# Patient Record
Sex: FEMALE | URBAN - METROPOLITAN AREA
[De-identification: names, ages, dates, MRNs, and addresses within clinical notes are randomized per-mention and may not be internally consistent; named-entity substitution may affect disease eponyms.]

---

## 2017-08-01 ENCOUNTER — EMERGENCY (EMERGENCY)
Facility: HOSPITAL | Age: 22
LOS: 1 days | Discharge: HOME | End: 2017-08-01
Attending: EMERGENCY MEDICINE
Payer: COMMERCIAL

## 2017-08-01 VITALS
TEMPERATURE: 99 F | RESPIRATION RATE: 16 BRPM | DIASTOLIC BLOOD PRESSURE: 92 MMHG | OXYGEN SATURATION: 99 % | HEART RATE: 86 BPM | HEIGHT: 68 IN | SYSTOLIC BLOOD PRESSURE: 144 MMHG | WEIGHT: 199.96 LBS

## 2017-08-01 VITALS
TEMPERATURE: 98 F | RESPIRATION RATE: 16 BRPM | DIASTOLIC BLOOD PRESSURE: 71 MMHG | OXYGEN SATURATION: 100 % | HEART RATE: 56 BPM | SYSTOLIC BLOOD PRESSURE: 120 MMHG

## 2017-08-01 DIAGNOSIS — R55 SYNCOPE AND COLLAPSE: ICD-10-CM

## 2017-08-01 LAB
ALBUMIN SERPL ELPH-MCNC: 4.2 G/DL — SIGNIFICANT CHANGE UP (ref 3.3–5.2)
ALP SERPL-CCNC: 90 U/L — SIGNIFICANT CHANGE UP (ref 40–120)
ALT FLD-CCNC: 13 U/L — SIGNIFICANT CHANGE UP
ANION GAP SERPL CALC-SCNC: 13 MMOL/L — SIGNIFICANT CHANGE UP (ref 5–17)
AST SERPL-CCNC: 24 U/L — SIGNIFICANT CHANGE UP
BILIRUB SERPL-MCNC: 0.2 MG/DL — LOW (ref 0.4–2)
BUN SERPL-MCNC: 7 MG/DL — LOW (ref 8–20)
CALCIUM SERPL-MCNC: 8.8 MG/DL — SIGNIFICANT CHANGE UP (ref 8.6–10.2)
CHLORIDE SERPL-SCNC: 104 MMOL/L — SIGNIFICANT CHANGE UP (ref 98–107)
CO2 SERPL-SCNC: 22 MMOL/L — SIGNIFICANT CHANGE UP (ref 22–29)
CREAT SERPL-MCNC: 0.83 MG/DL — SIGNIFICANT CHANGE UP (ref 0.5–1.3)
GLUCOSE SERPL-MCNC: 82 MG/DL — SIGNIFICANT CHANGE UP (ref 70–115)
HCG SERPL-ACNC: <2 MIU/ML — SIGNIFICANT CHANGE UP
HCT VFR BLD CALC: 37.5 % — SIGNIFICANT CHANGE UP (ref 37–47)
HGB BLD-MCNC: 12.2 G/DL — SIGNIFICANT CHANGE UP (ref 12–16)
MCHC RBC-ENTMCNC: 29.4 PG — SIGNIFICANT CHANGE UP (ref 27–31)
MCHC RBC-ENTMCNC: 32.5 G/DL — SIGNIFICANT CHANGE UP (ref 32–36)
MCV RBC AUTO: 90.4 FL — SIGNIFICANT CHANGE UP (ref 81–99)
PLATELET # BLD AUTO: 216 K/UL — SIGNIFICANT CHANGE UP (ref 150–400)
POTASSIUM SERPL-MCNC: 4.5 MMOL/L — SIGNIFICANT CHANGE UP (ref 3.5–5.3)
POTASSIUM SERPL-SCNC: 4.5 MMOL/L — SIGNIFICANT CHANGE UP (ref 3.5–5.3)
PROT SERPL-MCNC: 7.4 G/DL — SIGNIFICANT CHANGE UP (ref 6.6–8.7)
RBC # BLD: 4.15 M/UL — LOW (ref 4.4–5.2)
RBC # FLD: 12.2 % — SIGNIFICANT CHANGE UP (ref 11–15.6)
SODIUM SERPL-SCNC: 139 MMOL/L — SIGNIFICANT CHANGE UP (ref 135–145)
TROPONIN T SERPL-MCNC: <0.01 NG/ML — SIGNIFICANT CHANGE UP (ref 0–0.06)
WBC # BLD: 5.2 K/UL — SIGNIFICANT CHANGE UP (ref 4.8–10.8)
WBC # FLD AUTO: 5.2 K/UL — SIGNIFICANT CHANGE UP (ref 4.8–10.8)

## 2017-08-01 PROCEDURE — 93010 ELECTROCARDIOGRAM REPORT: CPT

## 2017-08-01 PROCEDURE — 71045 X-RAY EXAM CHEST 1 VIEW: CPT

## 2017-08-01 PROCEDURE — 84702 CHORIONIC GONADOTROPIN TEST: CPT

## 2017-08-01 PROCEDURE — 99285 EMERGENCY DEPT VISIT HI MDM: CPT

## 2017-08-01 PROCEDURE — 71010: CPT | Mod: 26

## 2017-08-01 PROCEDURE — 93005 ELECTROCARDIOGRAM TRACING: CPT

## 2017-08-01 PROCEDURE — 99284 EMERGENCY DEPT VISIT MOD MDM: CPT | Mod: 25

## 2017-08-01 PROCEDURE — 36415 COLL VENOUS BLD VENIPUNCTURE: CPT

## 2017-08-01 PROCEDURE — 82962 GLUCOSE BLOOD TEST: CPT

## 2017-08-01 PROCEDURE — 84484 ASSAY OF TROPONIN QUANT: CPT

## 2017-08-01 PROCEDURE — 85027 COMPLETE CBC AUTOMATED: CPT

## 2017-08-01 PROCEDURE — 80053 COMPREHEN METABOLIC PANEL: CPT

## 2017-08-01 RX ORDER — SODIUM CHLORIDE 9 MG/ML
1000 INJECTION INTRAMUSCULAR; INTRAVENOUS; SUBCUTANEOUS ONCE
Qty: 0 | Refills: 0 | Status: COMPLETED | OUTPATIENT
Start: 2017-08-01 | End: 2017-08-01

## 2017-08-01 RX ADMIN — SODIUM CHLORIDE 1000 MILLILITER(S): 9 INJECTION INTRAMUSCULAR; INTRAVENOUS; SUBCUTANEOUS at 10:40

## 2017-08-01 NOTE — CONSULT NOTE ADULT - PROBLEM SELECTOR RECOMMENDATION 9
1.  2. Follow-up echocardiogram   3. 1. Plan for outpatient evaluation at [facility]

## 2017-08-01 NOTE — ED PROVIDER NOTE - OBJECTIVE STATEMENT
21 y/o female states she was well until today she was at work and passed out and she is not sure for how long but thinks she was in and out of it for 10 min and she feels fine now she had only some water to drink She passed out 2 years ago when she was pregnant

## 2017-08-01 NOTE — CONSULT NOTE ADULT - SUBJECTIVE AND OBJECTIVE BOX
Patient is a 22y old  Female who presents with a chief complaint of         HPI: 23 yo F w/ no significant cardiac history p/w syncope.         PAST MEDICAL & SURGICAL HISTORY:  Anemia        Vital Signs Last 24 Hrs  T(C): 36.8 (01 Aug 2017 11:18), Max: 37.1 (01 Aug 2017 08:59)  T(F): 98.2 (01 Aug 2017 11:18), Max: 98.8 (01 Aug 2017 08:59)  HR: 56 (01 Aug 2017 11:18) (56 - 86)  BP: 120/71 (01 Aug 2017 11:18) (120/71 - 144/92)  BP(mean): --  RR: 16 (01 Aug 2017 11:18) (16 - 16)  SpO2: 100% (01 Aug 2017 11:18) (99% - 100%)    PHYSICAL EXAM:      Constitutional: no acute distress    Neck: no JVD     Respiratory: CTA B    Cardiovascular: RRR no MRG     Extremities: no c/c/e-warm               I&O's Detail                              12.2   5.2   )-----------( 216      ( 01 Aug 2017 10:02 )             37.5     08-01    139  |  104  |  7.0<L>  ----------------------------<  82  4.5   |  22.0  |  0.83    Ca    8.8      01 Aug 2017 10:02    TPro  7.4  /  Alb  4.2  /  TBili  0.2<L>  /  DBili  x   /  AST  24  /  ALT  13  /  AlkPhos  90  08-01    CARDIAC MARKERS ( 01 Aug 2017 10:02 )  x     / <0.01 ng/mL / x     / x     / x              I&O's Summary    BNP  RADIOLOGY & ADDITIONAL STUDIES: Patient is a 22y old  Female who presents with a chief complaint of         HPI: 21 yo F w/ no significant cardiac history p/w syncope. Patient reports having dental procedure a few days before and taking narcotics and antibiotics. Patient reports being at work, standing up, getting dizzy and weak and felt like she was going to fall and apparently coworker caught her. Patient denies chest pain, dyspnea.         PAST MEDICAL & SURGICAL HISTORY:  Anemia        Vital Signs Last 24 Hrs  T(C): 36.8 (01 Aug 2017 11:18), Max: 37.1 (01 Aug 2017 08:59)  T(F): 98.2 (01 Aug 2017 11:18), Max: 98.8 (01 Aug 2017 08:59)  HR: 56 (01 Aug 2017 11:18) (56 - 86)  BP: 120/71 (01 Aug 2017 11:18) (120/71 - 144/92)  BP(mean): --  RR: 16 (01 Aug 2017 11:18) (16 - 16)  SpO2: 100% (01 Aug 2017 11:18) (99% - 100%)    PHYSICAL EXAM:      Constitutional: no acute distress    Neck: no JVD     Respiratory: CTA B    Cardiovascular: RRR no MRG     Extremities: no c/c/e-warm               I&O's Detail                              12.2   5.2   )-----------( 216      ( 01 Aug 2017 10:02 )             37.5     08-01    139  |  104  |  7.0<L>  ----------------------------<  82  4.5   |  22.0  |  0.83    Ca    8.8      01 Aug 2017 10:02    TPro  7.4  /  Alb  4.2  /  TBili  0.2<L>  /  DBili  x   /  AST  24  /  ALT  13  /  AlkPhos  90  08-01    CARDIAC MARKERS ( 01 Aug 2017 10:02 )  x     / <0.01 ng/mL / x     / x     / x              I&O's Summary    BNP  RADIOLOGY & ADDITIONAL STUDIES:

## 2017-08-01 NOTE — ED PROVIDER NOTE - MEDICAL DECISION MAKING DETAILS
syncope prob vasovagal will check labs and get cardiology eval and echo and dispo as per their recommendation

## 2017-08-01 NOTE — ED ADULT NURSE NOTE - OBJECTIVE STATEMENT
22 year old a&ox3 female comes to ED c/o of syncopal episode. pt. was at work and took her pain medication oxycodone and amoxicillin without food. pt. recently got her tooth pulled so took the medication. pt. states she fell to the floor and was in and out of it. pt. states she does have a hx of anemia but no blood transfusions. 22 year old a&ox3 female comes to ED c/o of syncopal episode. pt. was at work and took her pain medication oxycodone and amoxicillin without food. pt. recently got her tooth pulled out on Saturday so took her medication. pt. states she started getting dizzy and was was about to faint she said her co worker caught her. pt. states she does have a hx of anemia but no blood transfusions.

## 2017-08-01 NOTE — ED ADULT TRIAGE NOTE - CHIEF COMPLAINT QUOTE
pt comes to ed from work; had tooth pulled saturday. took percocet and amoxicillin this morning started feeling dizzy and states she fell. denies loc but states she was "woozy" awake alert and oriented x3; texting on phone

## 2017-08-01 NOTE — CONSULT NOTE ADULT - ASSESSMENT
23 yo F p/w syncope... 23 yo F p/w syncope that appears vasovagal in origin. Note, EKG shows sinus rhythm without concerning features. Labs normal. It is reasonable to discharge patient from ER with follow-up this week in office.

## 2017-08-03 ENCOUNTER — TRANSCRIPTION ENCOUNTER (OUTPATIENT)
Age: 22
End: 2017-08-03

## 2017-08-08 ENCOUNTER — TRANSCRIPTION ENCOUNTER (OUTPATIENT)
Age: 22
End: 2017-08-08

## 2021-01-14 NOTE — PERIOP NOTES
[facility]  Ambulatory Surgery Unit  Pre-operative Instructions    Surgery/Procedure Date  Wednesday, January 20, 2021            Tentative Arrival Time 1030      1. On the day of your surgery/procedure, please report to the Ambulatory Surgery Unit Registration Desk and sign in at your designated time. The Ambulatory Surgery Unit is located in [facility] on the [facility] side of the [facility] across from the [address] [place] [address]. Please have all of your health insurance cards and a photo ID. 2. You must have someone with you to drive you home, as you should not drive a car for 24 hours following anesthesia. Please make arrangements for a responsible adult friend or family member to stay with you for at least the first 24 hours after your surgery. 3. Do not have anything to eat or drink (including water, gum, mints, coffee, juice) after 11:59 PM, Tuesday. This may not apply to medications prescribed by your physician. (Please note below the special instructions with medications to take the morning of surgery, if applicable.)    4. We recommend you do not drink any alcoholic beverages for 24 hours before and after your surgery. 5. Contact your surgeons office for instructions on the following medications: non-steroidal anti-inflammatory drugs (i.e. Advil, Aleve), vitamins, and supplements. (Some surgeons will want you to stop these medications prior to surgery and others may allow you to take them)   **If you are currently taking Plavix, Coumadin, Aspirin and/or other blood-thinning agents, contact your surgeon for instructions. ** Your surgeon will partner with the physician prescribing these medications to determine if it is safe to stop or if you need to continue taking. Please do not stop taking these medications without instructions from your surgeon.     6. In an effort to help prevent surgical site infection, we ask that you shower with an anti-bacterial soap (i.e. Dial/Safeguard, or the soap provided to you at your preadmission testing appointment) for 3 days prior to and on the morning of surgery, using a fresh towel after each shower. (Please begin this process with fresh bed linens.) Do not apply any lotions, powders, or deodorants after the shower on the day of your procedure. If applicable, please do not shave the operative site for 48 hours prior to surgery. 7. Wear comfortable clothes. Wear glasses instead of contacts. Do not bring any jewelry or money (other than copays or fees as instructed). Do not wear make-up, particularly mascara, the morning of your surgery. Do not wear nail polish, particularly if you are having foot /hand surgery. Wear your hair loose or down, no ponytails, buns, sosa pins or clips. All body piercings must be removed. 8. You should understand that if you do not follow these instructions your surgery may be cancelled. If your physical condition changes (i.e. fever, cold or flu) please contact your surgeon as soon as possible. 9. It is important that you be on time. If a situation occurs where you may be late, or if you have any questions or problems, please call [phone].    10. Your surgery time may be subject to change. You will receive a phone call the day prior to surgery to confirm your arrival time. 11. Pediatric patients: please bring a change of clothes, diapers, bottle/sippy cup, pacifier, etc.      Special Instructions: Take all medications and inhalers, as prescribed, on the morning of surgery with a sip of water. I understand a pre-operative phone call will be made to verify my surgery time. In the event that I am not available, I give permission for a message to be left on my answering service and/or with another person?       yes    Preop instructions reviewed  Pt verbalized understanding.      ___________________      ___________________      ________________  (Signature of Patient)          (Witness) (Date and Time)

## 2021-01-16 ENCOUNTER — HOSPITAL ENCOUNTER (OUTPATIENT)
Dept: PREADMISSION TESTING | Age: 26
Discharge: HOME | End: 2021-01-16
Payer: COMMERCIAL

## 2021-01-16 PROCEDURE — 87635 SARS-COV-2 COVID-19 AMP PRB: CPT

## 2021-01-18 LAB
HEALTH STATUS, XMCV2T: NORMAL
SARS-COV-2, COV2NT: NOT DETECTED
SOURCE, COVRS: NORMAL
SPECIMEN SOURCE, FCOV2M: NORMAL
SPECIMEN TYPE, XMCV1T: NORMAL

## 2021-01-19 ENCOUNTER — ANESTHESIA EVENT (OUTPATIENT)
Dept: SURGERY | Age: 26
End: 2021-01-19
Payer: COMMERCIAL

## 2021-01-20 ENCOUNTER — ANESTHESIA (OUTPATIENT)
Dept: SURGERY | Age: 26
End: 2021-01-20
Payer: COMMERCIAL

## 2021-01-20 ENCOUNTER — HOSPITAL ENCOUNTER (OUTPATIENT)
Age: 26
Setting detail: OUTPATIENT SURGERY
Discharge: HOME | End: 2021-01-20
Attending: ORTHOPAEDIC SURGERY | Admitting: ORTHOPAEDIC SURGERY
Payer: COMMERCIAL

## 2021-01-20 VITALS
HEIGHT: 68 IN | DIASTOLIC BLOOD PRESSURE: 82 MMHG | SYSTOLIC BLOOD PRESSURE: 151 MMHG | BODY MASS INDEX: 32.28 KG/M2 | OXYGEN SATURATION: 100 % | RESPIRATION RATE: 20 BRPM | WEIGHT: 213 LBS | TEMPERATURE: 97.1 F | HEART RATE: 74 BPM

## 2021-01-20 DIAGNOSIS — G89.18 POST-OPERATIVE PAIN: Primary | ICD-10-CM

## 2021-01-20 LAB — HCG UR QL: NEGATIVE

## 2021-01-20 PROCEDURE — C1713 ANCHOR/SCREW BN/BN,TIS/BN: HCPCS | Performed by: ORTHOPAEDIC SURGERY

## 2021-01-20 PROCEDURE — 77030041680 HC PNCL ELECSURG SMK EVAC CNMD -B: Performed by: ORTHOPAEDIC SURGERY

## 2021-01-20 PROCEDURE — 76210000050 HC AMBSU PH II REC 0.5 TO 1 HR: Performed by: ORTHOPAEDIC SURGERY

## 2021-01-20 PROCEDURE — 77030018835 HC SOL IRR LR ICUM -A: Performed by: ORTHOPAEDIC SURGERY

## 2021-01-20 PROCEDURE — 2709999900 HC NON-CHARGEABLE SUPPLY: Performed by: ORTHOPAEDIC SURGERY

## 2021-01-20 PROCEDURE — 74011250636 HC RX REV CODE- 250/636: Performed by: NURSE ANESTHETIST, CERTIFIED REGISTERED

## 2021-01-20 PROCEDURE — 76030000019 HC AMB SURG 1 TO 1.5 HR INTENSV-TIER 1: Performed by: ORTHOPAEDIC SURGERY

## 2021-01-20 PROCEDURE — 77030008552 HC TBNG SMK EVAC BFLF -A: Performed by: ORTHOPAEDIC SURGERY

## 2021-01-20 PROCEDURE — 74011250636 HC RX REV CODE- 250/636: Performed by: ANESTHESIOLOGY

## 2021-01-20 PROCEDURE — 77030020143 HC AIRWY LARYN INTUB CGAS -A: Performed by: NURSE ANESTHETIST, CERTIFIED REGISTERED

## 2021-01-20 PROCEDURE — 77030013837 HC NERV BLK KT BBMI -B: Performed by: ANESTHESIOLOGY

## 2021-01-20 PROCEDURE — 77030008496 HC TBNG ARTHSC IRR S&N -B: Performed by: ORTHOPAEDIC SURGERY

## 2021-01-20 PROCEDURE — 77030006884 HC BLD SHV INCIS S&N -B: Performed by: ORTHOPAEDIC SURGERY

## 2021-01-20 PROCEDURE — 77030013789 HC KT REP BIO TEND ARTH -C: Performed by: ORTHOPAEDIC SURGERY

## 2021-01-20 PROCEDURE — 76210000035 HC AMBSU PH I REC 1 TO 1.5 HR: Performed by: ORTHOPAEDIC SURGERY

## 2021-01-20 PROCEDURE — C1762 CONN TISS, HUMAN(INC FASCIA): HCPCS | Performed by: ORTHOPAEDIC SURGERY

## 2021-01-20 PROCEDURE — 77030002916 HC SUT ETHLN J&J -A: Performed by: ORTHOPAEDIC SURGERY

## 2021-01-20 PROCEDURE — 81025 URINE PREGNANCY TEST: CPT

## 2021-01-20 PROCEDURE — 77030000032 HC CUF TRNQT ZIMM -B: Performed by: ORTHOPAEDIC SURGERY

## 2021-01-20 PROCEDURE — 76060000062 HC AMB SURG ANES 1 TO 1.5 HR: Performed by: ORTHOPAEDIC SURGERY

## 2021-01-20 PROCEDURE — 74011250637 HC RX REV CODE- 250/637: Performed by: ORTHOPAEDIC SURGERY

## 2021-01-20 DEVICE — GRAFT TISS TEND 6.5X300MM --: Type: IMPLANTABLE DEVICE | Site: KNEE | Status: FUNCTIONAL

## 2021-01-20 RX ORDER — CEFAZOLIN SODIUM 1 G/3ML
INJECTION, POWDER, FOR SOLUTION INTRAMUSCULAR; INTRAVENOUS AS NEEDED
Status: DISCONTINUED | OUTPATIENT
Start: 2021-01-20 | End: 2021-01-20 | Stop reason: HOSPADM

## 2021-01-20 RX ORDER — SODIUM CHLORIDE, SODIUM LACTATE, POTASSIUM CHLORIDE, CALCIUM CHLORIDE 600; 310; 30; 20 MG/100ML; MG/100ML; MG/100ML; MG/100ML
25 INJECTION, SOLUTION INTRAVENOUS CONTINUOUS
Status: DISCONTINUED | OUTPATIENT
Start: 2021-01-20 | End: 2021-01-20 | Stop reason: HOSPADM

## 2021-01-20 RX ORDER — SODIUM CHLORIDE 0.9 % (FLUSH) 0.9 %
5-40 SYRINGE (ML) INJECTION AS NEEDED
Status: DISCONTINUED | OUTPATIENT
Start: 2021-01-20 | End: 2021-01-20 | Stop reason: HOSPADM

## 2021-01-20 RX ORDER — DIPHENHYDRAMINE HYDROCHLORIDE 50 MG/ML
12.5 INJECTION, SOLUTION INTRAMUSCULAR; INTRAVENOUS AS NEEDED
Status: DISCONTINUED | OUTPATIENT
Start: 2021-01-20 | End: 2021-01-20 | Stop reason: HOSPADM

## 2021-01-20 RX ORDER — SODIUM CHLORIDE 0.9 % (FLUSH) 0.9 %
5-40 SYRINGE (ML) INJECTION EVERY 8 HOURS
Status: DISCONTINUED | OUTPATIENT
Start: 2021-01-20 | End: 2021-01-20 | Stop reason: HOSPADM

## 2021-01-20 RX ORDER — FENTANYL CITRATE 50 UG/ML
25 INJECTION, SOLUTION INTRAMUSCULAR; INTRAVENOUS
Status: DISCONTINUED | OUTPATIENT
Start: 2021-01-20 | End: 2021-01-20 | Stop reason: HOSPADM

## 2021-01-20 RX ORDER — HYDROMORPHONE HYDROCHLORIDE 1 MG/ML
.2-.5 INJECTION, SOLUTION INTRAMUSCULAR; INTRAVENOUS; SUBCUTANEOUS
Status: DISCONTINUED | OUTPATIENT
Start: 2021-01-20 | End: 2021-01-20 | Stop reason: HOSPADM

## 2021-01-20 RX ORDER — MORPHINE SULFATE 10 MG/ML
2 INJECTION, SOLUTION INTRAMUSCULAR; INTRAVENOUS
Status: DISCONTINUED | OUTPATIENT
Start: 2021-01-20 | End: 2021-01-20 | Stop reason: HOSPADM

## 2021-01-20 RX ORDER — ROPIVACAINE HYDROCHLORIDE 5 MG/ML
INJECTION, SOLUTION EPIDURAL; INFILTRATION; PERINEURAL
Status: COMPLETED
Start: 2021-01-20 | End: 2021-01-20

## 2021-01-20 RX ORDER — ROPIVACAINE HYDROCHLORIDE 5 MG/ML
INJECTION, SOLUTION EPIDURAL; INFILTRATION; PERINEURAL AS NEEDED
Status: DISCONTINUED | OUTPATIENT
Start: 2021-01-20 | End: 2021-01-20 | Stop reason: HOSPADM

## 2021-01-20 RX ORDER — LIDOCAINE HYDROCHLORIDE 10 MG/ML
0.1 INJECTION, SOLUTION EPIDURAL; INFILTRATION; INTRACAUDAL; PERINEURAL AS NEEDED
Status: DISCONTINUED | OUTPATIENT
Start: 2021-01-20 | End: 2021-01-20 | Stop reason: HOSPADM

## 2021-01-20 RX ORDER — ONDANSETRON 2 MG/ML
4 INJECTION INTRAMUSCULAR; INTRAVENOUS AS NEEDED
Status: DISCONTINUED | OUTPATIENT
Start: 2021-01-20 | End: 2021-01-20 | Stop reason: HOSPADM

## 2021-01-20 RX ORDER — ONDANSETRON 2 MG/ML
INJECTION INTRAMUSCULAR; INTRAVENOUS AS NEEDED
Status: DISCONTINUED | OUTPATIENT
Start: 2021-01-20 | End: 2021-01-20 | Stop reason: HOSPADM

## 2021-01-20 RX ORDER — PROPOFOL 10 MG/ML
INJECTION, EMULSION INTRAVENOUS AS NEEDED
Status: DISCONTINUED | OUTPATIENT
Start: 2021-01-20 | End: 2021-01-20 | Stop reason: HOSPADM

## 2021-01-20 RX ORDER — DEXAMETHASONE SODIUM PHOSPHATE 4 MG/ML
INJECTION, SOLUTION INTRA-ARTICULAR; INTRALESIONAL; INTRAMUSCULAR; INTRAVENOUS; SOFT TISSUE AS NEEDED
Status: DISCONTINUED | OUTPATIENT
Start: 2021-01-20 | End: 2021-01-20 | Stop reason: HOSPADM

## 2021-01-20 RX ORDER — MIDAZOLAM HYDROCHLORIDE 1 MG/ML
INJECTION, SOLUTION INTRAMUSCULAR; INTRAVENOUS AS NEEDED
Status: DISCONTINUED | OUTPATIENT
Start: 2021-01-20 | End: 2021-01-20 | Stop reason: HOSPADM

## 2021-01-20 RX ORDER — OXYCODONE HYDROCHLORIDE 5 MG/1
5 TABLET ORAL
Qty: 30 TAB | Refills: 0 | Status: SHIPPED | OUTPATIENT
Start: 2021-01-20 | End: 2021-01-27

## 2021-01-20 RX ORDER — PROPOFOL 10 MG/ML
INJECTION, EMULSION INTRAVENOUS
Status: COMPLETED
Start: 2021-01-20 | End: 2021-01-20

## 2021-01-20 RX ORDER — FENTANYL CITRATE 50 UG/ML
INJECTION, SOLUTION INTRAMUSCULAR; INTRAVENOUS AS NEEDED
Status: DISCONTINUED | OUTPATIENT
Start: 2021-01-20 | End: 2021-01-20 | Stop reason: HOSPADM

## 2021-01-20 RX ORDER — MIDAZOLAM HYDROCHLORIDE 1 MG/ML
INJECTION, SOLUTION INTRAMUSCULAR; INTRAVENOUS
Status: COMPLETED
Start: 2021-01-20 | End: 2021-01-20

## 2021-01-20 RX ADMIN — ROPIVACAINE HYDROCHLORIDE 20 ML: 5 INJECTION, SOLUTION EPIDURAL; INFILTRATION; PERINEURAL at 11:42

## 2021-01-20 RX ADMIN — ONDANSETRON HYDROCHLORIDE 4 MG: 2 INJECTION, SOLUTION INTRAMUSCULAR; INTRAVENOUS at 13:13

## 2021-01-20 RX ADMIN — MIDAZOLAM HYDROCHLORIDE 2 MG: 1 INJECTION, SOLUTION INTRAMUSCULAR; INTRAVENOUS at 11:29

## 2021-01-20 RX ADMIN — PROPOFOL 30 MG: 10 INJECTION, EMULSION INTRAVENOUS at 11:41

## 2021-01-20 RX ADMIN — PROPOFOL 20 MG: 10 INJECTION, EMULSION INTRAVENOUS at 13:17

## 2021-01-20 RX ADMIN — DEXAMETHASONE SODIUM PHOSPHATE 4 MG: 4 INJECTION, SOLUTION INTRAMUSCULAR; INTRAVENOUS at 12:15

## 2021-01-20 RX ADMIN — CEFAZOLIN 2 G: 1 INJECTION, POWDER, FOR SOLUTION INTRAMUSCULAR; INTRAVENOUS; PARENTERAL at 12:10

## 2021-01-20 RX ADMIN — SODIUM CHLORIDE, POTASSIUM CHLORIDE, SODIUM LACTATE AND CALCIUM CHLORIDE 25 ML/HR: 600; 310; 30; 20 INJECTION, SOLUTION INTRAVENOUS at 11:07

## 2021-01-20 RX ADMIN — PROPOFOL 30 MG: 10 INJECTION, EMULSION INTRAVENOUS at 11:32

## 2021-01-20 RX ADMIN — PROPOFOL 200 MG: 10 INJECTION, EMULSION INTRAVENOUS at 12:05

## 2021-01-20 RX ADMIN — FENTANYL CITRATE 50 MCG: 50 INJECTION, SOLUTION INTRAMUSCULAR; INTRAVENOUS at 12:11

## 2021-01-20 RX ADMIN — FENTANYL CITRATE 50 MCG: 50 INJECTION, SOLUTION INTRAMUSCULAR; INTRAVENOUS at 12:31

## 2021-01-20 RX ADMIN — MIDAZOLAM HYDROCHLORIDE 2 MG: 1 INJECTION, SOLUTION INTRAMUSCULAR; INTRAVENOUS at 12:01

## 2021-01-20 RX ADMIN — ROPIVACAINE HYDROCHLORIDE 25 ML: 5 INJECTION, SOLUTION EPIDURAL; INFILTRATION; PERINEURAL at 11:33

## 2021-01-20 RX ADMIN — PROPOFOL 70 MG: 10 INJECTION, EMULSION INTRAVENOUS at 12:07

## 2021-01-20 RX ADMIN — Medication 3 AMPULE: at 11:07

## 2021-01-20 NOTE — ANESTHESIA POSTPROCEDURE EVALUATION
Procedure(s):  LEFT KNEE ARTHROSCOPY WITH ANTERIOR CRUCIATE LIGAMENT RECONSTRUCTION WITH HAMSTRING ALLOGRAFT.     general    Anesthesia Post Evaluation      Multimodal analgesia: multimodal analgesia used between 6 hours prior to anesthesia start to PACU discharge  Patient location during evaluation: bedside  Patient participation: complete - patient participated  Level of consciousness: awake  Pain management: adequate  Airway patency: patent  Anesthetic complications: no  Cardiovascular status: acceptable  Respiratory status: acceptable  Hydration status: acceptable  Post anesthesia nausea and vomiting:  controlled  Final Post Anesthesia Temperature Assessment:  Normothermia (36.0-37.5 degrees C)      INITIAL Post-op Vital signs:   Vitals Value Taken Time   /82 01/20/21 1430   Temp 36.4 °C (97.6 °F) 01/20/21 1327   Pulse 74 01/20/21 1430   Resp 20 01/20/21 1430   SpO2 100 % 01/20/21 1430

## 2021-01-20 NOTE — ANESTHESIA PROCEDURE NOTES
Peripheral Block    Start time: 1/20/2021 11:38 AM  End time: 1/20/2021 11:45 AM  Performed by: Kaden Greer MD  Authorized by: Kaden Greer MD       Pre-procedure: Indications: at surgeon's request and post-op pain management    Preanesthetic Checklist: patient identified, risks and benefits discussed, site marked, timeout performed, anesthesia consent given and patient being monitored      Block Type:   Block Type:   Adductor canal  Laterality:  Left  Monitoring:  Standard ASA monitoring, continuous pulse ox, frequent vital sign checks, heart rate, responsive to questions and oxygen  Injection Technique:  Single shot  Procedures: ultrasound guided    Patient Position: supine  Prep: chlorhexidine    Location:  Mid thigh  Needle Type:  Stimuplex  Needle Gauge:  21 G  Needle Localization:  Anatomical landmarks and ultrasound guidance    Assessment:  Number of attempts:  1  Injection Assessment:  Incremental injection every 5 mL, local visualized surrounding nerve on ultrasound, negative aspiration for blood, no paresthesia, no intravascular symptoms and ultrasound image on chart  Patient tolerance:  Patient tolerated the procedure well with no immediate complications  42ZM 6.8% Ropivacaine

## 2021-01-20 NOTE — ANESTHESIA PROCEDURE NOTES
Peripheral Block    Start time: 1/20/2021 11:28 AM  End time: 1/20/2021 11:35 AM  Performed by: Santos Oconnor MD  Authorized by: Santos Oconnor MD       Pre-procedure: Indications: at surgeon's request and post-op pain management    Preanesthetic Checklist: patient identified, risks and benefits discussed, site marked, timeout performed, anesthesia consent given and patient being monitored      Block Type:   Block Type:  Popliteal  Laterality:  Left  Monitoring:  Standard ASA monitoring, frequent vital sign checks, responsive to questions and oxygen  Injection Technique:  Single shot  Procedures: nerve stimulator    Patient Position: prone  Prep: chlorhexidine    Location:  Upper thigh  Needle Type:  Stimuplex  Needle Gauge:  22 G  Needle Localization:  Nerve stimulator  Motor Response comment:   Motor Response: minimal motor response >0.4 mA     Assessment:  Number of attempts:  1  Injection Assessment:  Incremental injection every 5 mL, no paresthesia, no intravascular symptoms and negative aspiration for blood  Patient tolerance:  Patient tolerated the procedure well with no immediate complications  80NF 3.2% Ropivacaine    Patient tolerated procedure well. No pain, paresthesia, or blood noted. VSS throughout. No complications noted.

## 2021-01-20 NOTE — PERIOP NOTES
German Diaz  1995  [id number]    Situation:  Verbal report given from: Elmon Stage, [address] CRNA  Procedure: Procedure(s):  LEFT KNEE ARTHROSCOPY WITH ANTERIOR CRUCIATE LIGAMENT RECONSTRUCTION WITH HAMSTRING ALLOGRAFT    Background:    Preoperative diagnosis: LEFT KNEE ACL TEAR, MCL TEAR, OSTEOCHONDRAL DEFECT    Postoperative diagnosis: LEFT KNEE ACL TEAR, MCL TEAR, OSTEOCHONDRAL DEFECT    :  Dr. Vazquez Dears    Assistant(s): Circ-2: Ravinder Davis RN  Scrub Tech-1: Jose Rafael Justice  Surg Asst-1: Chyrl Bud    Specimens: * No specimens in log *    Assessment:  Intra-procedure medications         Anesthesia gave intra-procedure sedation and medications, see anesthesia flow sheet     Intravenous fluids: LR@ KVO     Vital signs stable VSS      Recommendation:    Permission to share finding with Boyfriend : yes

## 2021-01-20 NOTE — H&P
HISTORY AND PHYSICAL    Subjective:     Patient is a 26 y.o.  female presented with a history of left knee pain/instability. Onset of symptoms was abrupt with gradually worsening course since that time. She is being admitted for surgical management of this condition. The indications for the procedure include failed conservative tx. There are no active problems to display for this patient. Past Medical History:   Diagnosis Date    Anemia     in the past    Arrhythmia       Past Surgical History:   Procedure Laterality Date    HX DILATION AND CURETTAGE      termination    HX HEENT      two teeth exracted      Prior to Admission medications    Not on File     No Known Allergies   Social History     Tobacco Use    Smoking status: Current Every Day Smoker    Smokeless tobacco: Never Used    Tobacco comment: between tobacco and marijuana   Substance Use Topics    Alcohol use: Yes     Comment: 4 drinks over weekend      History reviewed. No pertinent family history. Review of Systems  Pertinent items are noted in HPI. Objective:     Patient Vitals for the past 8 hrs:   BP Temp Pulse Resp SpO2 Weight   01/20/21 1153 (!) 159/100  (!) 57 14 99 %    01/20/21 1148 (!) 150/100  (!) 59 16 100 %    01/20/21 1047 (!) 145/85 98.3 °F (36.8 °C) 62 13 97 % 96.6 kg (213 lb)     Physical Exam:  General:     Alert and oriented. No acute distress. Chest:     Respirations unlabored. Abdomen:     Extremities:   Soft, non-tender. No evidence of cyanosis. Pulses palpable in both upper and lower extremities. Abbey's sign negative in bilateral lower extremities. Moving all extremities. +lachmans L knee        Neurologic:  No motor deficits. Sensation stable. Neurovascular exam within normal limits. Imaging Review  MRI of L knee shows evidence of acl tear, mcl tear, and ocd LFC    Assessment:     Active Problems:    * No active hospital problems.  *      Plan:     The various methods of treatment have been discussed with the patient and family. After consideration of risks, benefits and other options for treatment, the patient has consented to surgical interventions (L Knee arthroscopy with acl recon using allograft, mcl repair, and microfx L LFC). The risks of surgery were explained. Risks of infection, blood loss, neurovascular injury, anesthesia risks, and risks secondary to patient comorbidities were explained. Questions were answered and Pre-op teaching was done by nursing.      Hussein Albarado

## 2021-01-20 NOTE — DISCHARGE INSTRUCTIONS
CHILDREN[facility]  Dr Montez Capps  ACL Surgery: Postoperative Instructions    IMPORTANT  Successful rehab after ACL surgery has been directly associated with the ability to fully straighten out your knee at the end of rehab. Following these instructions will help ensure that we do everything possible to achieve this goal. Elevation of the leg along with cold therapy is critical in the first weeks after surgery in an effort to reduce swelling. This ensures that the knee will move easily to regain motion once therapy is started the second week. · Your leg has been placed in a hinged brace after surgery for comfort and to protect your new graft. · The brace has been locked at 10 degrees to keep your leg as straight as possible after surgery   · This allows you to walk on the leg without the leg giving way and to prevent you from losing the ability to fully straighten your leg. · This brace should be worn AT ALL TIMES when you are out of bed after surgery   · This brace may be taken off when you are in bed and laying down so that you may apply the cryotherapy unit (polar care ice) over the dressing. · The optimal position of the leg after surgery is for you to be lying flat with your ankle higher than your heart , in an effort to reduce swelling. · Place pillows under your ANKLE to allow your knee to sag backwards to keep it straight   · You may practice bending the knee with no weight on it, but should return it to straight position when you are resting & elevating  · DO NOT sit with a pillow under your knee, as this will lead you to being unable to fully straighten your leg after surgery. · If for some reason your brace becomes unlocked or loose, please contact my office for instruction on how to fix this issue.   · You will come out of your brace with your physical therapist for therapy, but will not be released from your brace until you can demonstrate the ability to walk without crutches or a limp.    DIET  · You may resume your regular diet once you tolerate liquids/bland food without nausea    MEDICATION  · Take the pain medication as prescribed, WITH FOOD  · While taking pain medications, you may NOT operate a vehicle, heavy machinery, or appliances, or drink alcoholic beverages  · If you have an allergic reaction to the medication, stop taking it and call my office. · If you are not allergic, take one Aspirin 81 mg twice a day after eating to prevent blood clots  · Please keep in mind that constipation is a very common side effect of taking narcotic pain medication. Take precautions to prevent constipation:  · Drink plenty of water (6-8 -  8 ounce glasses a day)  · Avoid alcohol, caffeine & dairy products  · Eat plenty of fiber (fruits, vegetables & whole grains)  · Take an over the counter stool softener such as Colace or Dulcolax or Miralax    ACTIVITY  · You may practice quadriceps muscle tightening and straight leg raises several times every hour  · Please continue to move your ankle up and down and tighten and relax your calf muscles several times every hour to help reduce swelling and prevent blood clots  · Please use your crutches until your first post operative visit  · If comfortable, you may bear weight on your leg with the assistance of crutches  · It is important to continuously elevate your knee above your heart until your swelling is completely down. · Please keep ice applied to the knee for the first 72 hours or as long as pain or swelling is persists. Do not apply ice directly to the skin, or allow water to leak on to your dressing. DRESSING CARE  · Keep the dressing clean and dry. · It is normal to get some bloody drainage through the bandage. DO NOT BE ALARMED. · If the dressing gets soaked with drainage, please reinforce with a dry sterile dressing. · Loosen the ace wrap around your knee if it becomes too tight or painful. · Remove all dressings 3 days after surgery.  If there is still some wound seepage, apply a fresh STERILE gauze over the incisions and secure with tape or an ace wrap. · DO NOT TOUCH OR REMOVE THE SUTURES! SHOWERING  · You may sponge bathe for the first 72 hours, taking care to keep the dressing clean and dry. · You may remove the dressing in 3 days and shower after surgery unless told otherwise. DO NOT immerse the knee under water. DO NOT rub/scrub the incision. DO NOT apply any ointments. After showering pat the incisions dry & place new Band-Aids over the sutures. EMERGENCY/FOLLOW - UP  · Please notify my office at [phone] if you develop any fever (above 101), unexpected warmth, redness or swelling. Please call if your toes become cold, purple, numb, or there is excessive bleeding. · Please call the office within 24 hours at [phone] to schedule a follow up appointment within 7-10 days from surgery. · Narcotic pain medication refills cannot be called into your pharmacy and the prescription must be picked up at our office. No pain medication refills can be provided after 3pm on Fridays or over the weekend. DO NOT TAKE TYLENOL/ACETAMINOPHEN WITH PERCOCET, [facility], [address] TAKE NARCOTIC PAIN MEDICATIONS WITH FOOD! For the night of surgery, while block is still in effect, start with 1 pain pill at bedtime    Narcotics tend to be constipating and we recommend taking a stool softener such as Colace or Miralax (follow package instructions). If you were given prescriptions, please review the written information on the prescribed medications. DO NOT DRIVE WHILE TAKING NARCOTIC PAIN MEDICATIONS. CPAP PATIENTS BE SURE TO WEAR MACHINE WHENEVER NAPPING OR SLEEPING DAY/NIGHT OF SURGERY! DISCHARGE SUMMARY from Nurse    The following personal items collected during your admission are returned to you:   Dental Appliance: Dental Appliances:  At home  Vision: Visual Aid: None  Hearing Aid:    Jewelry: Jewelry: None  Clothing: Clothing: With patient  Other Valuables: Other Valuables: Cell Phone, Kev Everett(in pacu)  Valuables sent to safe:        PATIENT INSTRUCTIONS:    After General Anesthesia or Intravenous Sedation, for 24 hours or while taking prescription Narcotics:  · Someone should be with you for the next 24 hours. · For your own safety, a responsible adult must drive you home. · Limit your activities  · Recommended activity: Rest today, up with assistance today. Do not climb stairs or shower unattended for the next 24 hours. · Start with a soft bland diet and advance as tolerated (no nausea) to regular diet. · If you have a sore throat some things that may help are: fluids, warm salt water gargle, or throat lozenges. If this does not improve after several days please follow up with your family physician. · Do not drive and operate hazardous machinery  · Do not make important personal or business decisions  · Do  not drink alcoholic beverages  · If you have not urinated within 8 hours after discharge, please contact your surgeon on call. Report the following to your surgeon:  · Excessive pain, swelling, redness or odor of or around the surgical area  · Temperature over 100.5  · Nausea and vomiting lasting longer than 4 hours or if unable to take medications  · Any signs of decreased circulation or nerve impairment to extremity: change in color, persistent  numbness, tingling, coldness or increase pain      · If you received a lower extremity nerve block, please use your crutches, walker, or cane until the nerve block has worn off, then refer to your surgeons post-operative instructions.    · You will receive a Post Operative Call from one of the Recovery Room Nurses on the day after your surgery to check on you. It is very important for us to know how you are recovering after your surgery. If you have an issue please call your surgeon, do not wait for the post operative call.     · You may receive an e-mail or letter in the mail from [facility] regarding your experience with us in the Ambulatory Surgery Unit. Your feedback is valuable to us and we appreciate your participation in the survey. ·   · If the above instructions are not adequate or you are having problems after your surgery, call your physician at their office number. ·   · We wish youre a speedy recovery ? What to do at Home:    *  Please give a list of your current medications to your Primary Care Provider. *  Please update this list whenever your medications are discontinued, doses are      changed, or new medications (including over-the-counter products) are added. *  Please carry medication information at all times in case of emergency situations. If you have not had your influenza or pneumococcal vaccines, please follow up with your primary care physician. The discharge information has been reviewed with the patient and caregiver. The patient and caregiver verbalized understanding. TO PREVENT AN INFECTION      1. [address] YOUR HANDS     To prevent infection, good handwashing is the most important thing you or your caregiver can do.  Wash your hands with soap and water or use the hand  we gave you before you touch any wounds. 2. SHOWER     Use the antibacterial soap we gave you when you take a shower.  Shower with this soap until your wounds are healed.  To reach all areas of your body, you may need someone to help you.  Dont forget to clean your belly button with every shower. 3.  USE CLEAN SHEETS     Use freshly cleaned sheets on your bed after surgery.  To keep the surgery site clean, do not allow pets to sleep with you while your wound is still healing. 4. STOP SMOKING     Stop smoking, or at least cut back on smoking     Smoking slows your healing. 5.  CONTROL YOUR BLOOD SUGAR     High blood sugars slow wound healing.      If you are diabetic, control your blood sugar levels before and after your surgery.

## 2021-01-20 NOTE — ANESTHESIA PREPROCEDURE EVALUATION
Relevant Problems   No relevant active problems       Anesthetic History   No history of anesthetic complications            Review of Systems / Medical History  Patient summary reviewed, nursing notes reviewed and pertinent labs reviewed    Pulmonary  Within defined limits                 Neuro/Psych   Within defined limits           Cardiovascular                  Exercise tolerance: >4 METS     GI/Hepatic/Renal  Within defined limits              Endo/Other        Obesity     Other Findings              Physical Exam    Airway  Mallampati: I  TM Distance: 4 - 6 cm  Neck ROM: normal range of motion   Mouth opening: Normal     Cardiovascular  Regular rate and rhythm,  S1 and S2 normal,  no murmur, click, rub, or gallop             Dental  No notable dental hx       Pulmonary  Breath sounds clear to auscultation               Abdominal  GI exam deferred       Other Findings            Anesthetic Plan    ASA: 1  Anesthesia type: general    Monitoring Plan: BIS  Post-op pain plan if not by surgeon: peripheral nerve block single    Induction: Intravenous  Anesthetic plan and risks discussed with: Patient

## 2021-01-20 NOTE — PERIOP NOTES
Dr. Nancy Diaz performed nerve block in preop using ultrasound machine to LLE. Pt on CM x3, 02 by NC at 3L, patient responsive when spoken to. Able to answer questions appropriately. Pt did receive 2 mg Versed and 60 mg Propofol given by Dr. Nancy Diaz for sedation. Pt tolerated procedure well.  VSS and will continue to monitor

## 2021-01-20 NOTE — PERIOP NOTES
Permission received to review discharge instructions and discuss private health information with Boyfriend, Lavonne Ramirez. Patient states that family  will be with them for at least 24 hours following today's procedure.    Air Warming blanket placed on pt; turned on for comfort

## 2021-01-21 NOTE — OP NOTES
[address]  OPERATIVE REPORT    Name:  Primitivo Montalvo  MR#:  [id number]  :  1995  ACCOUNT #:  [de-identified]  DATE OF SERVICE:  2021    LOCATION:  [facility] Ambulatory Surgery Unit. PREOPERATIVE DIAGNOSES:  1. Left knee anterior cruciate ligament tear. 2.  Left knee chondral changes of nonweightbearing, posterolateral femoral condyle. 3.  Left knee medial collateral ligament tear. POSTOPERATIVE DIAGNOSES:  1. Left knee anterior cruciate ligament tear. 2.  Left knee chondral changes of nonweightbearing, posterolateral femoral condyle. 3.  Left knee medial collateral ligament tear. PROCEDURE PERFORMED:  Left knee arthroscopy with anterior cruciate ligament reconstruction using a hamstring allograft. SURGEON:  Niko Villalobos DO    ASSISTANT:  [facility] staff. ANESTHESIA:  General with nerve block. COMPLICATIONS:  None. SPECIMENS REMOVED:  None. IMPLANTS:  Arthrex ABS button and TightRope button devices. ESTIMATED BLOOD LOSS:  Minimal.    DISPOSITION:  Stable to PACU. INDICATIONS FOR THE PROCEDURE:  The patient is a 20-year-old female, who presents to the office after an injury and a fall. She was found to have an ACL tear on MRI. We discussed treatment options and she was medically optimized and ready for surgery approximately 10 months ago. However, she delayed her surgery and presented to the office just recently. We again discussed treatment options. She did have some stiffness in the knee that was present and some quadriceps weakness compared to her previous visit. We once again discussed treatment options for her left knee. We discussed left knee arthroscopy with ACL reconstruction, possible MCL repair, and possible microfracture/abrasion chondroplasty procedure for the osteochondral defect at the posterior femur.   We discussed risk of infection, blood loss, neurovascular injury, anesthesia risk, and risks secondary to the patient's comorbidities. Once she was medially optimized and ready for surgery, we also discussed graft options. She elected to proceed with an allograft as she did have significant stiffness in the knee and she wants to prevent any risk of further stiffness after surgery. She was given a nerve block by Anesthesia prior to the procedure and taken to the OR on 01/20/2021. REPORT OF OPERATION:  The patient was taken to the OR and transferred to the operating room table. She was placed in the supine position and all prominences were carefully padded. She was given sedation and LMA was placed by Anesthesia. Sterile prepping and draping was performed. After sterile prepping and draping, a time-out was called. The patient was identified by name, date of birth, operative site and operative procedure. After all were in agreement that the left knee was the operative extremity, we started by making an incision for the lateral knee arthroscopy portal.  We inflated tourniquet to 300 mmHg and identified the torn ACL. We prepped her ACL allograft using a semitendinosus allograft and a GraftLink prep technique using the Arthrex system. We placed button devices on each side. We sized our graft to a size 67 x 9 mm graft. We placed it under tension on the back table as we continued our knee arthroscopy. We moved to the medial compartment after visualizing intact chondral surfaces of the patellofemoral joint. At the medial compartment, we made a medial portal through an outside-in technique and probed the posterior medial meniscus to be intact. Her MCL was also intact on valgus stress. She had no signs of chondral damage at the medial compartment. We moved to the ACL and once again probed to be chronically torn and scarred down to the tibial spine. We moved to the lateral compartment, which showed intact chondral structures and lateral meniscus.   Her posterolateral femoral condyle osteochondral defects must be very far posterior as we took her to all weightbearing surface and could not identify any chondral changes. We used the arthroscopic shaver to prepare our notch and debride the remnant ACL stump of the tibia. Based on templating of the ACL graft, we chose the size 9 mm FlipCutter device using Arthrex system. We used our femoral and tibial guide starting with the femoral side and back reaming approximately 30 mm socket, 9 mm in diameter for placement of our ACL graft. We placed the suture passing stitch for later passage of our graft. We did the same thing on the tibial side and after clearing all bony debris, we retrieved our graft to the medial portal first to the femoral side. We flipped our button device on the femoral cortex and shifted approximately 10-15 mm of graft into the tunnel. We then down tracked tibial side and pulled this down over an ABS button device. We inserted approximately 15-20 mm of graft within the femoral side and then the tibial side of the tunnels. We cycled our knee and re-tensioned the graft to ensure all of the laxity was out of the ACL and the sutures. We oversewed the buttons into place and took final pictures. Closure with combination of 3-0 nylon suture was performed. Sterile dressings were applied and the patient was placed in a hinged knee brace locked in extension. She was transferred to PACU and will be discharged to home with specific instructions regarding dressing changes, activity restrictions and followup information.         Leah Mora DO [id number]  D:  01/20/2021 13:50  T:  01/21/2021 0:11  JOB #:  [id number]

## 2021-10-08 LAB
CHLAMYDIA, EXTERNAL: NEGATIVE
HBSAG, EXTERNAL: NEGATIVE
HIV, EXTERNAL: NON REACTIVE
N. GONORRHEA, EXTERNAL: NEGATIVE
RUBELLA, EXTERNAL: 9.83
T. PALLIDUM, EXTERNAL: NON REACTIVE
TYPE, ABO & RH, EXTERNAL: NORMAL

## 2022-03-03 LAB — T. PALLIDUM, EXTERNAL: NON REACTIVE

## 2022-04-26 LAB — GRBS, EXTERNAL: NEGATIVE

## 2022-05-16 ENCOUNTER — HOSPITAL ENCOUNTER (INPATIENT)
Age: 27
LOS: 3 days | Discharge: HOME | DRG: 560 | End: 2022-05-19
Attending: OBSTETRICS & GYNECOLOGY | Admitting: OBSTETRICS & GYNECOLOGY
Payer: MEDICAID

## 2022-05-16 ENCOUNTER — ANESTHESIA (OUTPATIENT)
Dept: LABOR AND DELIVERY | Age: 27
DRG: 560 | End: 2022-05-16
Payer: MEDICAID

## 2022-05-16 ENCOUNTER — ANESTHESIA EVENT (OUTPATIENT)
Dept: LABOR AND DELIVERY | Age: 27
DRG: 560 | End: 2022-05-16
Payer: MEDICAID

## 2022-05-16 PROBLEM — O36.5990 IUGR (INTRAUTERINE GROWTH RESTRICTION) AFFECTING CARE OF MOTHER: Status: ACTIVE | Noted: 2022-05-16

## 2022-05-16 LAB
ERYTHROCYTE [DISTWIDTH] IN BLOOD BY AUTOMATED COUNT: 12.9 % (ref 11.5–14.5)
HCT VFR BLD AUTO: 32.3 % (ref 35–47)
HGB BLD-MCNC: 10.3 G/DL (ref 11.5–16)
MCH RBC QN AUTO: 30.7 PG (ref 26–34)
MCHC RBC AUTO-ENTMCNC: 31.9 G/DL (ref 30–36.5)
MCV RBC AUTO: 96.4 FL (ref 80–99)
NRBC # BLD: 0 K/UL (ref 0–0.01)
NRBC BLD-RTO: 0 PER 100 WBC
PLATELET # BLD AUTO: 299 K/UL (ref 150–400)
PMV BLD AUTO: 11 FL (ref 8.9–12.9)
RBC # BLD AUTO: 3.35 M/UL (ref 3.8–5.2)
WBC # BLD AUTO: 6.7 K/UL (ref 3.6–11)

## 2022-05-16 PROCEDURE — 74011000250 HC RX REV CODE- 250: Performed by: ANESTHESIOLOGY

## 2022-05-16 PROCEDURE — 74011250636 HC RX REV CODE- 250/636: Performed by: ANESTHESIOLOGY

## 2022-05-16 PROCEDURE — 85027 COMPLETE CBC AUTOMATED: CPT

## 2022-05-16 PROCEDURE — 94760 N-INVAS EAR/PLS OXIMETRY 1: CPT

## 2022-05-16 PROCEDURE — 36415 COLL VENOUS BLD VENIPUNCTURE: CPT

## 2022-05-16 PROCEDURE — 65270000029 HC RM PRIVATE

## 2022-05-16 PROCEDURE — 74011250636 HC RX REV CODE- 250/636: Performed by: OBSTETRICS & GYNECOLOGY

## 2022-05-16 PROCEDURE — 4A1HXCZ MONITORING OF PRODUCTS OF CONCEPTION, CARDIAC RATE, EXTERNAL APPROACH: ICD-10-PCS | Performed by: OBSTETRICS & GYNECOLOGY

## 2022-05-16 PROCEDURE — 3E033VJ INTRODUCTION OF OTHER HORMONE INTO PERIPHERAL VEIN, PERCUTANEOUS APPROACH: ICD-10-PCS | Performed by: OBSTETRICS & GYNECOLOGY

## 2022-05-16 PROCEDURE — 10907ZC DRAINAGE OF AMNIOTIC FLUID, THERAPEUTIC FROM PRODUCTS OF CONCEPTION, VIA NATURAL OR ARTIFICIAL OPENING: ICD-10-PCS | Performed by: OBSTETRICS & GYNECOLOGY

## 2022-05-16 RX ORDER — SODIUM CHLORIDE 0.9 % (FLUSH) 0.9 %
5-40 SYRINGE (ML) INJECTION AS NEEDED
Status: DISCONTINUED | OUTPATIENT
Start: 2022-05-16 | End: 2022-05-17 | Stop reason: HOSPADM

## 2022-05-16 RX ORDER — FENTANYL CITRATE 50 UG/ML
INJECTION, SOLUTION INTRAMUSCULAR; INTRAVENOUS AS NEEDED
Status: DISCONTINUED | OUTPATIENT
Start: 2022-05-16 | End: 2022-05-17 | Stop reason: HOSPADM

## 2022-05-16 RX ORDER — BUPIVACAINE HYDROCHLORIDE 2.5 MG/ML
INJECTION, SOLUTION EPIDURAL; INFILTRATION; INTRACAUDAL
Status: COMPLETED | OUTPATIENT
Start: 2022-05-16 | End: 2022-05-16

## 2022-05-16 RX ORDER — NORETHINDRONE AND ETHINYL ESTRADIOL 0.5-0.035
10 KIT ORAL ONCE
Status: COMPLETED | OUTPATIENT
Start: 2022-05-16 | End: 2022-05-16

## 2022-05-16 RX ORDER — LIDOCAINE HYDROCHLORIDE AND EPINEPHRINE 15; 5 MG/ML; UG/ML
INJECTION, SOLUTION EPIDURAL
Status: COMPLETED | OUTPATIENT
Start: 2022-05-16 | End: 2022-05-16

## 2022-05-16 RX ORDER — FENTANYL CITRATE 50 UG/ML
INJECTION, SOLUTION INTRAMUSCULAR; INTRAVENOUS
Status: COMPLETED
Start: 2022-05-16 | End: 2022-05-16

## 2022-05-16 RX ORDER — BUPIVACAINE HYDROCHLORIDE 2.5 MG/ML
INJECTION, SOLUTION EPIDURAL; INFILTRATION; INTRACAUDAL
Status: COMPLETED
Start: 2022-05-16 | End: 2022-05-16

## 2022-05-16 RX ORDER — ASPIRIN 81 MG/1
81 TABLET ORAL DAILY
COMMUNITY
End: 2022-05-19

## 2022-05-16 RX ORDER — FENTANYL CITRATE 50 UG/ML
100 INJECTION, SOLUTION INTRAMUSCULAR; INTRAVENOUS ONCE
Status: DISCONTINUED | OUTPATIENT
Start: 2022-05-16 | End: 2022-05-17 | Stop reason: HOSPADM

## 2022-05-16 RX ORDER — NALOXONE HYDROCHLORIDE 0.4 MG/ML
0.4 INJECTION, SOLUTION INTRAMUSCULAR; INTRAVENOUS; SUBCUTANEOUS AS NEEDED
Status: DISCONTINUED | OUTPATIENT
Start: 2022-05-16 | End: 2022-05-17 | Stop reason: HOSPADM

## 2022-05-16 RX ORDER — OXYTOCIN/RINGER'S LACTATE 30/500 ML
0-20 PLASTIC BAG, INJECTION (ML) INTRAVENOUS
Status: DISCONTINUED | OUTPATIENT
Start: 2022-05-16 | End: 2022-05-17 | Stop reason: HOSPADM

## 2022-05-16 RX ORDER — BUPIVACAINE HYDROCHLORIDE 5 MG/ML
30 INJECTION, SOLUTION EPIDURAL; INTRACAUDAL AS NEEDED
Status: DISCONTINUED | OUTPATIENT
Start: 2022-05-16 | End: 2022-05-17 | Stop reason: HOSPADM

## 2022-05-16 RX ORDER — TERBUTALINE SULFATE 1 MG/ML
0.25 INJECTION SUBCUTANEOUS AS NEEDED
Status: DISCONTINUED | OUTPATIENT
Start: 2022-05-16 | End: 2022-05-17 | Stop reason: HOSPADM

## 2022-05-16 RX ORDER — OXYTOCIN/RINGER'S LACTATE 30/500 ML
10 PLASTIC BAG, INJECTION (ML) INTRAVENOUS AS NEEDED
Status: DISCONTINUED | OUTPATIENT
Start: 2022-05-16 | End: 2022-05-17

## 2022-05-16 RX ORDER — SODIUM CHLORIDE 0.9 % (FLUSH) 0.9 %
5-40 SYRINGE (ML) INJECTION EVERY 8 HOURS
Status: DISCONTINUED | OUTPATIENT
Start: 2022-05-16 | End: 2022-05-17 | Stop reason: HOSPADM

## 2022-05-16 RX ORDER — NALBUPHINE HYDROCHLORIDE 20 MG/ML
10 INJECTION, SOLUTION INTRAMUSCULAR; INTRAVENOUS; SUBCUTANEOUS
Status: DISCONTINUED | OUTPATIENT
Start: 2022-05-16 | End: 2022-05-17 | Stop reason: HOSPADM

## 2022-05-16 RX ORDER — LIDOCAINE HYDROCHLORIDE AND EPINEPHRINE 15; 5 MG/ML; UG/ML
4.5 INJECTION, SOLUTION EPIDURAL AS NEEDED
Status: DISCONTINUED | OUTPATIENT
Start: 2022-05-16 | End: 2022-05-17 | Stop reason: HOSPADM

## 2022-05-16 RX ORDER — SODIUM CHLORIDE, SODIUM LACTATE, POTASSIUM CHLORIDE, CALCIUM CHLORIDE 600; 310; 30; 20 MG/100ML; MG/100ML; MG/100ML; MG/100ML
125 INJECTION, SOLUTION INTRAVENOUS CONTINUOUS
Status: DISCONTINUED | OUTPATIENT
Start: 2022-05-16 | End: 2022-05-17

## 2022-05-16 RX ORDER — OXYTOCIN/RINGER'S LACTATE 30/500 ML
87.3 PLASTIC BAG, INJECTION (ML) INTRAVENOUS AS NEEDED
Status: DISCONTINUED | OUTPATIENT
Start: 2022-05-16 | End: 2022-05-17

## 2022-05-16 RX ORDER — FENTANYL/BUPIVACAINE/NS/PF 2-1250MCG
1-16 PREFILLED PUMP RESERVOIR EPIDURAL CONTINUOUS
Status: DISCONTINUED | OUTPATIENT
Start: 2022-05-16 | End: 2022-05-17 | Stop reason: HOSPADM

## 2022-05-16 RX ADMIN — SODIUM CHLORIDE, POTASSIUM CHLORIDE, SODIUM LACTATE AND CALCIUM CHLORIDE 125 ML/HR: 600; 310; 30; 20 INJECTION, SOLUTION INTRAVENOUS at 17:20

## 2022-05-16 RX ADMIN — SODIUM CHLORIDE, POTASSIUM CHLORIDE, SODIUM LACTATE AND CALCIUM CHLORIDE 999 ML/HR: 600; 310; 30; 20 INJECTION, SOLUTION INTRAVENOUS at 22:05

## 2022-05-16 RX ADMIN — BUPIVACAINE HYDROCHLORIDE 5 ML: 2.5 INJECTION, SOLUTION EPIDURAL; INFILTRATION; INTRACAUDAL; PERINEURAL at 21:55

## 2022-05-16 RX ADMIN — Medication 3 ML: at 21:55

## 2022-05-16 RX ADMIN — Medication 10 ML/HR: at 21:56

## 2022-05-16 RX ADMIN — SODIUM CHLORIDE, POTASSIUM CHLORIDE, SODIUM LACTATE AND CALCIUM CHLORIDE 125 ML/HR: 600; 310; 30; 20 INJECTION, SOLUTION INTRAVENOUS at 23:10

## 2022-05-16 RX ADMIN — OXYTOCIN 1 MILLI-UNITS/MIN: 10 INJECTION INTRAVENOUS at 13:12

## 2022-05-16 RX ADMIN — EPHEDRINE SULFATE 10 MG: 50 INJECTION INTRAVENOUS at 22:19

## 2022-05-16 RX ADMIN — SODIUM CHLORIDE, POTASSIUM CHLORIDE, SODIUM LACTATE AND CALCIUM CHLORIDE 125 ML/HR: 600; 310; 30; 20 INJECTION, SOLUTION INTRAVENOUS at 13:11

## 2022-05-16 RX ADMIN — FENTANYL CITRATE 100 MCG: 50 INJECTION, SOLUTION INTRAMUSCULAR; INTRAVENOUS at 21:56

## 2022-05-16 NOTE — PROGRESS NOTES
1535 Bedside and Verbal shift change report given to Charline RNC (oncoming nurse) by Libia Guerrier RN (offgoing nurse). Report included the following information SBAR, Kardex, Intake/Output, MAR, Accordion, Recent Results and Med Rec Status. 2143: Dr Efrain Fung at bedside to place epidural, patient tolerated well. 2345 Bedside and Verbal shift change report given to [address] (oncoming nurse) by Tracey Go (offgoing nurse). Report included the following information SBAR, Kardex, Intake/Output, MAR, Accordion, Recent Results and Med Rec Status.

## 2022-05-16 NOTE — PROGRESS NOTES
5/16/2022 12:11 PM Received client to LDR#9 for a scheduled induction due to IUGR. Client denies any other complications with pregnancy. Client states she is 3cm dilated and she has been this way for weeks. 1230-DrHaynes Mike at bedside talking with client about POC for the day. 1255-IV started LFA without any difficult. 1312- Pitocin started. 1420-Client up to bathroom   1545-Bedside and Verbal shift change report given to WEST Christensen RN  (oncoming nurse) by ALFA Jimenes RN  (offgoing nurse). Report included the following information SBAR, Kardex, Intake/Output, MAR, Accordion and Recent Results.

## 2022-05-16 NOTE — H&P
[facility]  Edgar Faustin 33, [address]  Phone: [phone], Fax: [phone]  Date: 2022    Pregnancy Problems:   Fetal growth restriction - 8%dayton   COVID-19 - -Baby ASA -GS wk 36 wk   Uterine leiomyoma - 2 small, 1 cm intramural fibroids   Body mass index 30+ - obesity - pre-pregnancy BMI: 30.4, a1c__4.8_   Marijuana user - daily use   Smoker - 3 black/mild daily   Anxiety - Zoloft 50mg    6am/ iol/ smh ld/ clipp/ 5-24  History of Present Illness:  Pt is a 24 yo F at 39+0 presenting for IOL after she was noted to be IUGR on US today to follow up on growth after dx of COVID in December. .    EFW 8% with AC <1%. Pt has had poor weight gain this pregnancy, only having gained 8 lbs. She also smokes 3 Black & Milds daily. She denies vaginal bleeding, contractions, discharge, leaking, and decreased fetal movement. Assessment/Plan  1. Gestation period, 39 weeks -  Admit to L&D. Pitocin. GBS neg. EFM/[place].  AROM PRN. Epidural PRN. Anticipate .  Z3A.39: 39 weeks gestation of pregnancy    2. Fetal growth restriction -  EFW 8%. UA dopplers WNL. P86.2235: Maternal care for other known or suspected poor fetal growth, third trimester, fetus 1    3. Smoker -  Cessation encouraged. G27.037: Smoking (tobacco) complicating pregnancy, third trimester    4. Anxiety -  Continue Zoloft. O99.343: Other mental disorders complicating pregnancy, third trimester    5. COVID-19 -  Previously on ASA. U07.1: COVID-19      Return to Office     Natali Fields MD for Surgery at [facility] () on 2022 at 06:00 AM  Prenatal Flowsheet:  Fundus Pres FHR FM PLS Cervix Exam BP Wt Edema Glucose Protein Leukocytes Nitrite Ketones Blood   10/08/2021   7 wks 4 days   [email or web address]                         145    124/78 197 lbs          Comments: New to Dr. Kimani Dixon. Plans Mat 21 @ 11w, CF and SMA today. Reports mild headaches.  Complains of moderate nausea/vomiting. Mild cramping. Marijuana daily, helps with anxiety and appetite. Enc to cut down. OB folder, nutrition, meds, nausea, deck/hosp. Has babyscripts. Plans to get the Covid vaccine. 11/11/2021   12 wks 3 days   [email or web address]                         162    118/72 194 lbs none         Comments: Rm 12: New to [place]. C/o nausea qod with \"spit up,\" significantly decreased appetite, gas pain, and intermittent vaginal itching. reports eating once or twice daily only. states she has been feeling very cold recently. denies vaginal bleeding, abnl discharge, headaches, vision changes, pelvic pain. Normal NIPT- XY! Precautions reviewed. 12/08/2021   16 wks 2 days   [email or web address]                         150    116/63 186 lbs trace         Comments: Rm 12A: Denies VB/abnormal dc. Reports appetite is increasing. Gas pains still persisting and temperature fluctuations. Slight swelling in feet in evenings. Doing well overall. Declines flu vacc for whole pregnancy. Discussed weight loss. AFP today. Precautions reviewed. 01/07/2022   20 wks 4 days   [email or web address]                         143 Yes   112/74 190 lbs trace         Comments: Rm 15: US prior- needs additional views. Patient doing well. C/o lower back pain and acid reflux. Denies any HAs, n/v, vaginal bleeding or abnormal discharge. Taking baby ASA. Precautions reviewed. 02/03/2022   24 wks 3 days   [email or web address]                       24 cm  148 Yes   118/76 196 lbs trace         Comments: Rm 14: Patient doing well. Intermittent swelling and acid reflux. Reports nose bleed yesterday last less than a minute. Denies any HAs, n/v, vaginal bleeding or abnormal discharge. Patient would like to discuss baby's measurements from previous ultrasounds. Patient disclosed she is no longer using tobacco but is using marijuana twice daily to stimulate her appetite. We reviewed marijuana use in pregnancy and cessation was encouraged. Breastfeeding was discussed. 28 wk labs, tdap next OV.  Precautions reviewed. 03/03/2022   28 wks 3 days   [email or web address]                       28 cm  133 Yes   126/82 201 lbs trace none trace       Comments: Rm 12: Glucola and TDaP today. Patient doing well. Intermittent acid reflux. Denies any HAs, n/v, vaginal bleeding or abnormal discharge. 03/14/2022   30 wks   [email or web address]                       30 cm  166    122/80 201 lbs          Comments: Active FM. Denies ctx, cv, lof. Nomral 28 wk labs. Patient states job will not let her come out of work prior to delivery- requests letter to start leave but we discussed we cannot do this without a medical indication (which we do not have at this time). She is worried about low weight gain. FH appropriate so patient reassured but offered GS with next appt- pt will let me know if she desires to schedule this. Precautions reviewed. 03/31/2022   32 wks 3 days   [email or web address]                       31 cm  140 Yes   122/78 206 lbs none         Comments: rm 12 - Doing well. No HAs, nausea or vomiting, no VB or spotting. Has had 1-2 nosebleeds within the last couple weeks. Good fm. Precautions reviewed. 04/14/2022   34 wks 3 days   [email or web address]                       34 cm  147 Yes   118/76 206 lbs trace none neg       Comments: Doing well. Bbay very active. PTL, diet and weight reviewed. 04/26/2022   36 wks 1 days   [email or web address]                        Vertex 140 Yes  3cm / 70% / -2 130/82 205 lbs 1+ none neg       Comments: Rm 12: GBS today, no PCN allergy. US prior, BPP 8/8. EFW 11th%ile, normal dopplers. Patient doing well. C/o increased swelling in feet. Denies any HAs, vision changes, RUQ pain, ctx or LOF. Cervix 3/70/-2. GS in 3 wks per MFM. Precautions reviewed. 05/02/2022   37 wks   [email or web address]                       37 cm Vertex 149 Yes  3cm / 70% / -2 122/80 206 lbs trace         Comments: Patient doing well. No ctx, vb, lof. +FM. Precautions reviewed.    05/09/2022   38 wks   [email or web address]                       37 cm Vertex 142 Yes  3cm / 70% / -2 120/74 203 lbs trace none trace       Comments: Rm 15: Cervical check today. Patient doing well. Intermittent [address] and nausea. Denies any vaginal bleeding or lof. Has R tooth pain and swelling- advised to have dental visit asap. Discussed weight loss and FH stagnant. Smoking cessation again reviewed. GS next OV. Precautions reviewed. 05/16/2022   39 wks   [email or web address]                         135 Yes  3cm / 70% / 0 112/72 206 lbs trace         Comments: US prior, BPP 8/8. States right sided lower back pain. Having pelvic pressure w/ intermittent contractions. . Patient denies any headaches, n/v, vaginal bleeding or abnormal discharge. Cervical check today. Scheduled for delivery 5/24 w/ Dr. Ryan Fuentes, pt would like to know if she should keep 5/23 appt w/ Dr. Bess Peralta. FOB present at visit today. Growth is now 8%tile. Discussed warrants IOL today. 05/16/2022     [place]                                        Allergies:   Allergies not reviewed (last reviewed 04/14/2022)     NKDA   Medications:  Medications not reviewed (last reviewed 05/16/2022)     Baby Aspirin 02/03/22   entered    Prenatal 01/07/22   entered    Vital Signs:  None recorded  Problems:  Reviewed Problems    Past Medical History  Other , tore acl july 2020      Family History:  Discussed Family History    Mother - Malignant tumor of breast  - age 36   Maternal Grandmother - Hypertensive disorder   Paternal Grandmother - Malignant tumor of lung   Social History:  Discussed Social History    Substance Use  Do you or have you ever smoked tobacco?: Current every day smoker (Notes: mix with marijuana)  How much tobacco do you smoke?: 1/4 pack per day (Notes: pt unsure)  How much tobacco do you chew?: none  What was the date of your most recent tobacco screening?: 10/08/2021  What is your level of alcohol consumption?: Occasional (Notes: not since pregnant)  Do you use any illicit or recreational drugs?: Yes  Which illicit or recreational drugs have you used?: Marijuana  Education and Occupation  Are you currently employed?: Yes  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  Diet and Exercise  What is your exercise level?: Occasional  How many days of moderate to strenuous exercise, like a brisk walk, did you do in the last 7 days?: 0  Marriage and Sexuality  What is your relationship status?: Single  Are you sexually active?: Yes  Advanced Directive  Is blood transfusion acceptable in an emergency?: Yes  [place] and Travel  Have you recently traveled abroad?: No  Have you been to an area known to be high risk for COVID-19?: No  In the 14 days before symptom onset, have you had close contact with a laboratory-confirmed COVID-19 while that case was ill?: No  In the 14 days before symptom onset, have you had close contact with a person who is under investigation for COVID-19 while that person was ill?: No  Have you recently or are you planning to travel to an area with [address] virus?: No  Are you currently sexually active with anyone who has traveled (within the last 12 weeks) to a Zika-affected area?: No  OB/GYN Social History  Are you working: Yes  Other  Marital status: Single  Physical Exam  Constitutional:General Appearance: no acute distress. Mental Status Findings oriented to time, place, and person and appropriate mood. Head:Head: normocephalic. Respiratory:Lungs unlabored respiratory effort. Abdomen:Inspection and Palpation: gravid abdomen. Female :Cervix: as documented in prenatal flowsheet. Extremities:Extremities: no edema. Skin:General Skin no rash or suspicious lesions and normal general appearance. OB Labs    Result Value Ref.  Range Date Collected Date Reviewed Entered By Note   Initial Labs             Blood Type O  10/08/2021 10/20/2021 [email or web address]        D (Rh) Type Positive  10/08/2021 10/20/2021 [email or web address]        Antibody Screen Negative NEGATIVE 10/08/2021 10/20/2021 [email or web address]        Antibody Screen Negative NEGATIVE 03/03/2022 03/05/2022 [email or web address]        HCT - Initial 38.5 % 34.0-46.6 10/08/2021 10/20/2021 [email or web address]        HGB - Initial 13.2 g/dL 11.1-15.9 10/08/2021 10/20/2021 [email or web address]        MCV - Initial 95 fL 79-97 10/08/2021 10/20/2021 [email or web address]        PLT - Initial 257 x10E3/uL 150-450 10/08/2021 10/20/2021 [email or web address]        VDRL - Initial             Urine Culture/Screen Comment  10/08/2021 10/12/2021 [email or web address]        HBsAg Negative NEGATIVE 10/08/2021 10/20/2021 [email or web address]        HIV Counseling/Testing Non Reactive NON REACTIVE 10/08/2021 10/20/2021 [email or web address]        Chlamydia - Initial Negative NEGATIVE 10/08/2021 10/12/2021 [email or web address]        Gonorrhea - Initial Negative NEGATIVE 10/08/2021 10/12/2021 [email or web address]        Varicella             Rubella 9.83 index IMMUNE >0.99 10/08/2021 10/20/2021 [email or web address]    Optional Labs             HGB Electrophoresis   10/08/2021 10/20/2021 [email or web address]        PPD/Quanta             Pap Test             Cystic Fibrosis   10/08/2021 10/20/2021 [email or web address]        HPV             [place]             Familial Dysautonomia             Genetic Screening Tests             NST             TSH             Drug screen             HCV Ab   10/08/2021 10/20/2021 [email or web address]        HCV RNA             Urinalysis             Rhogam Injection         8-20 Week Labs             Ultrasound - Initial             1st Trimester Aneuploidy Risk Assessment             MSAFP/Multiple Markers   12/08/2021 12/10/2021 [email or web address]        2nd Trimester Serum Screening             Amnio/CVS             Karyotype             Amniotic Fluid (AFP)         24-28 Week Labs             HCT - 24-28 Weeks 34.9 % 34.0-46.6 03/03/2022 03/05/2022 [email or web address]        HGB - 24-28 Weeks 11.5 g/dL 11.1-15.9 03/03/2022 03/05/2022 [email or web address]        MCV - 24-28 Weeks 96 fL 79-97 03/03/2022 03/05/2022 [email or web address]        PLT - 24-28 Weeks 213 x10E3/uL 150-450 03/03/2022 03/05/2022 [email or web address]        Diabetes Screen 98 mg/dL  03/03/2022 03/05/2022 [email or web address] GTT (If Screen Abnormal)             D (Rh) Antibody Screen         32-36 Week Labs             HCT - 32-36 Weeks             HGB - 32-36 Weeks             MCV - 32-36 Weeks             PLT - 32-36 Weeks             Ultrasound - 32-36 Weeks             HIV (When Indicated)             VDRL - 32-36 Weeks             Gonorrhea - 32-36 Weeks             Chlamydia - 32-36 Weeks             Depression screening (when indicated)         35-37 Week Labs             Group B Strep             Resistance testing if penicillin allergic         Other Labs             Other - SPINAL MUSCULAR ATROPHY (SMA) MUTATIONS, BLOOD/TISSUE   10/08/2021 10/20/2021 [email or web address]        Other - HBA1C (HEMOGLOBIN A1C), BLOOD 4.8 % 4.8-5.6 10/08/2021 10/20/2021 [email or web address]    Vaccines  Vaccines not reviewed (last reviewed 10/08/2021)    Vaccine Type Date Amt. Route Site Ul[address] Lot # Mfr. Exp.   Date VIS VIS  Given Vaccinator   Diphtheria, Tetanus, Pertussis   Tdap 03/03/22 0.5 mL Intramuscular Deltoid, Right  [id number] [employer] 11/12/23 Tdap 08/06/2021 03/03/22 Freddy Mojica

## 2022-05-17 PROCEDURE — 2709999900 HC NON-CHARGEABLE SUPPLY

## 2022-05-17 PROCEDURE — 75410000002 HC LABOR FEE PER 1 HR

## 2022-05-17 PROCEDURE — 75410000000 HC DELIVERY VAGINAL/SINGLE

## 2022-05-17 PROCEDURE — 76060000078 HC EPIDURAL ANESTHESIA

## 2022-05-17 PROCEDURE — 75410000003 HC RECOV DEL/VAG/CSECN EA 0.5 HR

## 2022-05-17 PROCEDURE — 65410000002 HC RM PRIVATE OB

## 2022-05-17 PROCEDURE — 77030019905 HC CATH URETH INTMIT MDII -A

## 2022-05-17 PROCEDURE — 74011250637 HC RX REV CODE- 250/637: Performed by: ADVANCED PRACTICE MIDWIFE

## 2022-05-17 RX ORDER — SERTRALINE HYDROCHLORIDE 50 MG/1
50 TABLET, FILM COATED ORAL DAILY
Status: DISCONTINUED | OUTPATIENT
Start: 2022-05-17 | End: 2022-05-17

## 2022-05-17 RX ORDER — OXYTOCIN/RINGER'S LACTATE 30/500 ML
87.3 PLASTIC BAG, INJECTION (ML) INTRAVENOUS AS NEEDED
Status: DISCONTINUED | OUTPATIENT
Start: 2022-05-17 | End: 2022-05-19 | Stop reason: HOSPADM

## 2022-05-17 RX ORDER — IBUPROFEN 600 MG/1
600 TABLET ORAL
Status: DISCONTINUED | OUTPATIENT
Start: 2022-05-17 | End: 2022-05-19 | Stop reason: HOSPADM

## 2022-05-17 RX ORDER — OXYCODONE AND ACETAMINOPHEN 5; 325 MG/1; MG/1
1 TABLET ORAL
Status: DISCONTINUED | OUTPATIENT
Start: 2022-05-17 | End: 2022-05-19 | Stop reason: HOSPADM

## 2022-05-17 RX ORDER — HYDROCORTISONE ACETATE PRAMOXINE HCL 2.5; 1 G/100G; G/100G
CREAM TOPICAL AS NEEDED
Status: DISCONTINUED | OUTPATIENT
Start: 2022-05-17 | End: 2022-05-19 | Stop reason: HOSPADM

## 2022-05-17 RX ORDER — DOCUSATE SODIUM 100 MG/1
100 CAPSULE, LIQUID FILLED ORAL
Status: DISCONTINUED | OUTPATIENT
Start: 2022-05-17 | End: 2022-05-19 | Stop reason: HOSPADM

## 2022-05-17 RX ORDER — ACETAMINOPHEN 325 MG/1
650 TABLET ORAL
Status: DISCONTINUED | OUTPATIENT
Start: 2022-05-17 | End: 2022-05-19 | Stop reason: HOSPADM

## 2022-05-17 RX ORDER — OXYTOCIN/RINGER'S LACTATE 30/500 ML
10 PLASTIC BAG, INJECTION (ML) INTRAVENOUS AS NEEDED
Status: DISCONTINUED | OUTPATIENT
Start: 2022-05-17 | End: 2022-05-19 | Stop reason: HOSPADM

## 2022-05-17 RX ORDER — NALOXONE HYDROCHLORIDE 0.4 MG/ML
0.4 INJECTION, SOLUTION INTRAMUSCULAR; INTRAVENOUS; SUBCUTANEOUS AS NEEDED
Status: DISCONTINUED | OUTPATIENT
Start: 2022-05-17 | End: 2022-05-19 | Stop reason: HOSPADM

## 2022-05-17 RX ADMIN — ACETAMINOPHEN 650 MG: 325 TABLET ORAL at 21:50

## 2022-05-17 RX ADMIN — ACETAMINOPHEN 650 MG: 325 TABLET ORAL at 15:22

## 2022-05-17 RX ADMIN — IBUPROFEN 600 MG: 600 TABLET, FILM COATED ORAL at 15:22

## 2022-05-17 RX ADMIN — IBUPROFEN 600 MG: 600 TABLET, FILM COATED ORAL at 21:50

## 2022-05-17 RX ADMIN — ACETAMINOPHEN 650 MG: 325 TABLET ORAL at 08:41

## 2022-05-17 RX ADMIN — IBUPROFEN 600 MG: 600 TABLET, FILM COATED ORAL at 05:46

## 2022-05-17 NOTE — L&D DELIVERY NOTE
Delivery Summary    Patient: Farzana Carson MRN: [id number]  SSN: [id number]    YOB: 1995  Age: 23 y.o. Sex: female       Information for the patient's :  Jae Vigil [[id number]]       Labor Events:    Labor: No    Steroids: None   Cervical Ripening Date/Time:       Cervical Ripening Type: None   Antibiotics During Labor: No   Rupture Identifier:      Rupture Date/Time:       Rupture Type: AROM   Amniotic Fluid Volume: Moderate    Amniotic Fluid Description: Clear    Amniotic Fluid Odor: None    Induction: Oxytocin       Induction Date/Time: 2022 1:12 PM    Indications for Induction: IUGR    Augmentation:     Augmentation Date/Time:      Indications for Augmentation:     Labor complications: None       Additional complications:        Delivery Events:  Indications For Episiotomy:     Episiotomy: None   Perineal Laceration(s): None   Repaired:     Periurethral Laceration Location: left    Repaired: No    Labial Laceration Location:     Repaired:     Sulcal Laceration Location:     Repaired:     Vaginal Laceration Location:     Repaired:     Cervical Laceration Location:     Repaired:     Repair Suture: None   Number of Repair Packets:     Estimated Blood Loss (ml):  ml   Quantitative Blood Loss (ml)                Delivery Date: 2022    Delivery Time: 4:13 AM  Delivery Type: Vaginal, Spontaneous  Sex:  Male    Gestational Age: 36w3d   Delivery Clinician:  Dick Velez  Living Status: Living   Delivery Location: L&D L&D rm 9          APGARS  One minute Five minutes Ten minutes   Skin color: 1   1        Heart rate: 2   2        Grimace: 2   2        Muscle tone: 2   2        Breathin   2        Totals: 8   9            Presentation: Vertex    Position:   Occiput Anterior  Resuscitation Method:  Suctioning-bulb; Tactile Stimulation;Suctioning-tracheal;Oxygen     Meconium Stained: None      Cord Information: 3 Vessels  Complications: None  Cord around: trunk  Delayed cord clamping? Yes  Cord clamped date/time:2022  4:16 AM  Disposition of Cord Blood: Lab    Blood Gases Sent?: No    Placenta:  Date/Time: 2022  4:22 AM  Removal: Expressed      Appearance: Normal;Intact      Measurements:  Birth Weight:        Birth Length:        Head Circumference:        Chest Circumference:       Abdominal Girth: Other Providers:   ALEJANDRO GAONA;AYDEE FRAGOSO;JIM CHACON;;HAVEN JOHNSON, Primary Nurse;Primary  Nurse;Midwife;Nursery Nurse;Scrub Tech           Group B Strep:   Lab Results   Component Value Date/Time    GrBStrep, External negative 2022 12:00 AM     Information for the patient's :  Esperanza Garza [[id number]]   No results found for: ABORH, PCTABR, PCTDIG, BILI, ABORHEXT, ABORH     No results for input(s): PCO2CB, PO2CB, HCO3I, SO2I, IBD, PTEMPI, SPECTI, PHICB, ISITE, IDEV, IALLEN in the last 72 hours. Called by RN as patient 9/c/+1 with BBOW. Reviewed AROM with patient and patient consents. Now c/c/2, AROM of moderate amount of clear fluid. She pushed very well to [facility] of [facility] at 0413. No nuchal noted. Shoulders and body delivered with ease, cord noted around trunk and unwrapped, infant lifted to maternal abdomen, towel dried, vigorous cry. Cord double clamped by me and then cut by FOB after pulsations ceased. Apgars 8 at one minute and 9 at five minutes. Placenta spontaneous at 0422 with trailing membranes that were gently teased out with ring forceps. Examined and was found to be intact. 3 vessel cord. Fundus firm to massage. Pitocin IV per protocol. QBL per flowsheet. Vulva, vagina and perineum inspected and found to have small left periurethral laceration that was hemostatic and did not require repair. Bladder drained prior to leaving room. Mom and baby doing well. Baby remains skin to skin with mom.

## 2022-05-17 NOTE — PROGRESS NOTES
TRANSFER - IN REPORT:    Verbal report received from Melany Gray RN (name) on Ernie Meter  being received from L&D (unit) for routine progression of care      Report consisted of patients Situation, Background, Assessment and   Recommendations(SBAR). Information from the following report(s) SBAR was reviewed with the receiving nurse. Opportunity for questions and clarification was provided. Assessment completed upon patients arrival to unit and care assumed.

## 2022-05-17 NOTE — LACTATION NOTE
This note was copied from a baby's chart. Baby born vaginally early this morning to a  mom at 44 1/7 weeks gestation. Mom noticed breast changes during her pregnancy and has been able to express drops of colostrum. Mom states baby has latched but she has had have help getting him latched. I helped mom with a feeding this afternoon. We reviewed positioning the baby at the breast and how mom can help him get a deep latch. Mom was able to get him latched on her own in the football hold. He was sucking rhythmically with audible swallows. He nursed for 28 minutes. Baby is sga. Reviewed effects/risks of SGA on initiation of breast feeding including infant's sleepiness, ineffective or missed breast feedings, infant's decreased stamina to sustain prolonged latch and effective breast feeding, decreased energy reserves related to low birth weight and inability to stimulate milk supply. Recommended interventions include skin to skin, feeding on cues and pumping as needed to ensure adequate milk supply.      Mom will attempt to feed at least every 3 hours. She will not limit the time the baby is at the breast. She will offer both breasts at each feeding.

## 2022-05-17 NOTE — ANESTHESIA PREPROCEDURE EVALUATION
Relevant Problems   No relevant active problems       Anesthetic History   No history of anesthetic complications            Review of Systems / Medical History  Patient summary reviewed, nursing notes reviewed and pertinent labs reviewed    Pulmonary  Within defined limits                 Neuro/Psych   Within defined limits           Cardiovascular                  Exercise tolerance: >4 METS     GI/Hepatic/Renal  Within defined limits              Endo/Other             Other Findings              Physical Exam    Airway  Mallampati: I  TM Distance: 4 - 6 cm  Neck ROM: normal range of motion   Mouth opening: Normal     Cardiovascular  Regular rate and rhythm,  S1 and S2 normal,  no murmur, click, rub, or gallop             Dental  No notable dental hx       Pulmonary  Breath sounds clear to auscultation               Abdominal  GI exam deferred       Other Findings            Anesthetic Plan    ASA: 2  Anesthesia type: epidural            Anesthetic plan and risks discussed with: Patient

## 2022-05-17 NOTE — PROGRESS NOTES
2345 - Bedside and Verbal shift change report given to JANETTE Allan RNC (oncoming nurse) by Daniel Wheatley. Fermin RNC (offgoing nurse). Report included the following information SBAR, Kardex, Intake/Output, MAR, Accordion, Recent Results and Med Rec Status. 80 - Brycen Rigo CNM on recent SVE: 9/C/+1. She plans to come perform AROM (pt is agreeable). 0405 - S. Gracie Creamduncan at bedside for imminent delivery. 5 -  of live male infant. 042 - Straight cath by Carleen Delgado CNM (350 mL). 5685 - TRANSFER - OUT REPORT:    Verbal report given to [address] [facility](name) on Coleman Fabry  being transferred to MIU Rm 322(unit) for routine progression of care       Report consisted of patients Situation, Background, Assessment and   Recommendations(SBAR). Information from the following report(s) SBAR, Kardex, Procedure Summary, Intake/Output, MAR, Accordion, Recent Results and Med Rec Status was reviewed with the receiving nurse. Lines:   Peripheral IV 22 Distal;Left;Posterior Forearm (Active)   Site Assessment Clean, dry, & intact 22 1610   Phlebitis Assessment 0 22 1610   Infiltration Assessment 0 22 1610   Dressing Status Clean, dry, & intact 22 1610   Dressing Type Transparent;Tape 22 161   Hub Color/Line Status Infusing 22 161        Opportunity for questions and clarification was provided. Patient transported with:   Registered Nurse via hospital bed, accompanied by  and Aunt pushing NB in open crib; all belongings accounted for.

## 2022-05-17 NOTE — ANESTHESIA PROCEDURE NOTES
Epidural Block    Patient location during procedure: OB  Reason for block: labor epidural  Staffing  Performed: attending   Anesthesiologist: Kyle Haynes DO  Preanesthetic Checklist  Completed: patient identified, IV checked, site marked, risks and benefits discussed, surgical consent, monitors and equipment checked, pre-op evaluation and timeout performed  Block Placement  Patient position: sitting  Prep: ChloraPrep  Sterility prep: cap, drape, gloves and mask  Sedation level: no sedation  Patient monitoring: continuous pulse oximetry and heart rate  Approach: midline  Location: lumbar  Lumbar location: L3-L4  Epidural  Loss of resistance technique: air  Guidance: landmark technique  Needle  Needle type: Tuohy   Needle gauge: 17 G  Needle length: 9 cm  Needle insertion depth: 8 cm  Catheter type: end hole  Catheter size: 19 G  Catheter at skin depth: 13 cm  Catheter securement method: clear occlusive dressing and surgical tape  Test dose: negative  Medications Administered  Bupivacaine (PF) (MARCAINE) 0.25% Epidural, 5 mL  lidocaine-EPINEPHrine (XYLOCAINE) 1.5 %-1:200,000 Epidural, 3 mL  Assessment  Sensory level: T10  Block outcome: pain improved  Number of attempts: 1  Procedure assessment: patient tolerated procedure well with no immediate complications

## 2022-05-17 NOTE — PROGRESS NOTES
Labor Progress Note    S: Patient seen, fetal heart rate and contraction pattern evaluated. Just started getting uncomfortable with contractions. Wants epidural. Partner at bedside.     Physical Exam:  Patient Vitals for the past 4 hrs:   Temp Pulse BP   22 98.7 °F (37.1 °C) 65 132/75         Cervical Exam: 3-4/80/-1, vtx  Membranes:  Intact  Uterine Contractions:  Frequency: q2-5 minutes, moderate  Fetal Heart Rate: Reactive, 115s, +accels, neg decels, moderate variability      Assessment/Plan:    23 y.o.  at 39w0d IUP  IOL IUGR 8th%tile  Cat 1 tracing  GBS negative  Obesity  Uterine Fibroids  Smoker/THC use  Anxiety- on Zoloft    P:  Assumed care of patient  Introduced self to patient/partner  Reviewed SVE  Epidural per patient request  Continue pitocin per protocol  AROM prn  All questions asked/answered and patient/partner agree with plan    Maykel Jeffers CNM

## 2022-05-17 NOTE — PROGRESS NOTES
Labor Progress Note    S: Patient seen, fetal heart rate and contraction pattern evaluated. Comfortable with epidural. Support team at bedside.     Physical Exam:  Patient Vitals for the past 4 hrs:   Temp Pulse BP SpO2   22 2228  (!) 56 132/69    22 2226  (!) 52 131/65 100 %   22 2211  (!) 51 (!) 112/55 100 %   22 2202  (!) 52 (!) 144/62    22 2157  (!) 50 124/60    22 98.7 °F (37.1 °C) 65 132/75          Cervical Exam: deferred  Membranes:  Intact  Uterine Contractions:  Frequency: Irregular q3-5 minutes  Fetal Heart Rate: Reactive, 120s, +accels, neg decels, moderate variability      Assessment/Plan:    23 y.o.  at 39w0d IUP  IOL IUGR   Cat 1 tracing  GBS negative    P:  Pitocin was turned off 2220 d/t deep variables s/p epidural  Will plan to restart at midnight and titrate slowly    Theresa Aguilera CNM

## 2022-05-18 PROCEDURE — 74011250637 HC RX REV CODE- 250/637: Performed by: ADVANCED PRACTICE MIDWIFE

## 2022-05-18 PROCEDURE — 65410000002 HC RM PRIVATE OB

## 2022-05-18 RX ORDER — IBUPROFEN 600 MG/1
600 TABLET ORAL
Qty: 40 TABLET | Refills: 1 | Status: SHIPPED | OUTPATIENT
Start: 2022-05-18

## 2022-05-18 RX ADMIN — ACETAMINOPHEN 650 MG: 325 TABLET ORAL at 11:03

## 2022-05-18 RX ADMIN — IBUPROFEN 600 MG: 600 TABLET, FILM COATED ORAL at 07:25

## 2022-05-18 RX ADMIN — ACETAMINOPHEN 650 MG: 325 TABLET ORAL at 16:22

## 2022-05-18 RX ADMIN — ACETAMINOPHEN 650 MG: 325 TABLET ORAL at 02:35

## 2022-05-18 RX ADMIN — IBUPROFEN 600 MG: 600 TABLET, FILM COATED ORAL at 22:48

## 2022-05-18 RX ADMIN — DOCUSATE SODIUM 100 MG: 100 CAPSULE, LIQUID FILLED ORAL at 22:48

## 2022-05-18 RX ADMIN — IBUPROFEN 600 MG: 600 TABLET, FILM COATED ORAL at 13:14

## 2022-05-18 NOTE — PROGRESS NOTES
Bedside shift change report given to MAIK Baugh (oncoming nurse) by Yonatan Martin RN (offgoing nurse). Report included the following information SBAR.

## 2022-05-18 NOTE — PROGRESS NOTES
Post-Partum Day Number 1 Progress Note    Leah Jones     Assessment:   Doing well, post partum day 1  Boy desires circ however low BGs today thus deferred until tomorrow    Plan:  - Continue routine postpartum and perineal care as well as maternal education.  - N/A   - Plan discharge home [facility] Discharge Date: Tomorrow. Information for the patient's :  Kurtis Pavon [[id number]]   Vaginal, Spontaneous    Patient doing well without significant complaint. Voiding without difficulty, normal lochia. Vitals:  Visit Vitals  /75 (BP 1 Location: Right upper arm, BP Patient Position: At rest;Lying)   Pulse (!) 55   Temp 98.7 °F (37.1 °C)   Resp 14   Ht 5' 8\" (1.727 m)   Wt 93.4 kg (206 lb)   SpO2 100%   Breastfeeding Unknown   BMI 31.32 kg/m²     Temp (24hrs), Av.4 °F (36.9 °C), Min:98.2 °F (36.8 °C), Max:98.7 °F (37.1 °C)        Exam:   Patient without distress. Fundus firm, nontender per nursing fundal checks. Perineum with normal lochia noted per nursing assessment. Lower extremities are negative for pathological edema. Labs:     Lab Results   Component Value Date/Time    WBC 6.7 2022 12:57 PM    HGB 10.3 (L) 2022 12:57 PM    HCT 32.3 (L) 2022 12:57 PM    PLATELET 337  12:57 PM       No results found for this or any previous visit (from the past 24 hour(s)).

## 2022-05-18 NOTE — LACTATION NOTE
This note was copied from a baby's chart. SGA baby has been nursing well and had stable blood sugar until 24 hours of age. At that time he was treated with glucose gel, and continued breastfeeding,  Blood sugar check was borderline and second glucose gel was given. I worked with baby at breast and discovered that mother has abundant colostrum that is quickly transitioning to liquid white milk but that she misunderstood that baby should be offered both breasts per feeding rather than every other or just using one. Mother also shown how to gently wake baby before nursing sessions. Once awake, baby was eager to take second side and was vigorously nursing with audible swallowing. Mother would prefer to exclusively breastfeed if possible. We discussed pumping for EBM if supplement becomes necessary.

## 2022-05-18 NOTE — ROUTINE PROCESS
0740: Bedside and Verbal shift change report given to WEST Melara RN (oncoming nurse) by MAIK Art RN (offgoing nurse). Report included the following information SBAR, Procedure Summary, Intake/Output, MAR and Recent Results. 1103: RN at bedside, pt completed EPDS. Answered 'sometimes' on the final question of the EPDS, \"the thought of harming myself has occurred to me'. RN questioned pt in regards to when those thoughts occurred, and pt stated they were during her pregnancy, however, not within the last two weeks but within the last two months. Pt will complete new EPDS and answer questions based on feeling in lat 7-10 days. 1655: Spoke with Dr. DESMOND RENTERIA [facility] regarding EPDS score of 12 following correct guidelines, along with recent feelings of harming herself within the past two months. MD to pass along to discharge provider to follow up tomorrow AM and assess how pt is feeling prior to going home. Pt to follow up in one week with 453/846 Marbella Momin for mood check.

## 2022-05-18 NOTE — DISCHARGE SUMMARY
Obstetrical Discharge Summary     Name: Kostas Marin MRN: [id number]  SSN: [id number]    YOB: 1995  Age: 23 y.o. Sex: female      Admit Date: 2022    Discharge Date: 2022     Admitting Physician: Irina Cunningham MD     Attending Physician:  Chester Renae MD     Admission Diagnoses: IUGR (intrauterine growth restriction) affecting care of mother [O36.5990]    Discharge Diagnoses:   Information for the patient's :  Esperanza Garza [[id number]]   Delivery of a 2.49 kg male infant via Vaginal, Spontaneous on 2022 at 4:13 AM  by [address] were 8  and 9 . Additional Diagnoses:   Hospital Problems  Never Reviewed          Codes Class Noted POA    Normal labor and delivery ICD-10-CM: O80  ICD-9-CM: 222  2022 Unknown        IUGR (intrauterine growth restriction) affecting care of mother ICD-10-CM: O36.5990  ICD-9-CM: 656.50  2022 Unknown             Lab Results   Component Value Date/Time    Rubella, External 9.83 10/08/2021 12:00 AM    GrBStrep, External negative 2022 12:00 AM       Hospital Course: Normal hospital course following the delivery. Patient Instructions:   Current Discharge Medication List      START taking these medications    Details   ibuprofen (MOTRIN) 600 mg tablet Take 1 Tablet by mouth every six (6) hours as needed for Pain. Qty: 40 Tablet, Refills: 1         CONTINUE these medications which have NOT CHANGED    Details   PNV XW.02/ERADLZG fum/folic ac (PRENATAL PO) Take 1 Tablet by mouth daily. STOP taking these medications       aspirin delayed-release 81 mg tablet Comments:   Reason for Stopping:               Disposition at Discharge: Home or self care    Condition at Discharge: Stable    Reference my discharge instructions.     Follow-up Appointments   Procedures    FOLLOW UP VISIT Appointment in: 6 Weeks     Standing Status:   Standing     Number of Occurrences:   1     Order Specific Question:   Appointment in Answer:   6 Weeks        Signed By:  Iraj Donovan MD     May 18, 2022

## 2022-05-18 NOTE — LACTATION NOTE
This note was copied from a baby's chart. Infant with low blood sugar at last check and received glucose gel for 2nd time. Provided mom with instructions for set up, use and cleaning of breast pump. She will pump following each nursing session (at least every 3 hours) and any EBM obtained will be given to infant. Formula supplementation to follow to equal  10-15 ml of EBM/formula via syringe. 1500 Mom obtained 10 ml of breastmilk with pumping. Demonstrated syringe feeding to parents and infant tolerated feeding well. Mom will continue to pump following nursing sessions and will give EBM to infant.

## 2022-05-19 VITALS
WEIGHT: 206 LBS | HEART RATE: 68 BPM | DIASTOLIC BLOOD PRESSURE: 73 MMHG | BODY MASS INDEX: 31.22 KG/M2 | SYSTOLIC BLOOD PRESSURE: 124 MMHG | RESPIRATION RATE: 16 BRPM | TEMPERATURE: 99 F | OXYGEN SATURATION: 97 % | HEIGHT: 68 IN

## 2022-05-19 PROCEDURE — 74011250637 HC RX REV CODE- 250/637: Performed by: ADVANCED PRACTICE MIDWIFE

## 2022-05-19 RX ADMIN — ACETAMINOPHEN 650 MG: 325 TABLET ORAL at 11:34

## 2022-05-19 RX ADMIN — IBUPROFEN 600 MG: 600 TABLET, FILM COATED ORAL at 14:33

## 2022-05-19 RX ADMIN — ACETAMINOPHEN 650 MG: 325 TABLET ORAL at 06:14

## 2022-05-19 RX ADMIN — IBUPROFEN 600 MG: 600 TABLET, FILM COATED ORAL at 06:15

## 2022-05-19 NOTE — PROGRESS NOTES
Post-Partum Day Number 2 Progress Note    Leah Jones     Assessment: Doing well, post partum day 2    Plan:   - Discharge home today  - Follow up in office in 6 week(s) with [facility].  - Pain medication prescription(s) sent. - Questions answered. *circ consent reviewed in detail    Information for the patient's :  Kurtis Pavon [[id number]]   Vaginal, Spontaneous    Patient doing well without significant complaint. Voiding without difficulty, normal lochia. Ready for discharge home. Vitals:  Visit Vitals  /74 (BP 1 Location: Right upper arm, BP Patient Position: At rest)   Pulse 69   Temp 98 °F (36.7 °C)   Resp 16   Ht 5' 8\" (1.727 m)   Wt 93.4 kg (206 lb)   SpO2 97%   Breastfeeding Unknown   BMI 31.32 kg/m²     Temp (24hrs), Av.5 °F (36.9 °C), Min:98 °F (36.7 °C), Max:99.1 °F (37.3 °C)      Exam:        Patient without distress. Fundus firm, nontender                   Perineum with normal lochia noted per nursing assessment                Lower extremities are negative for pathological edema    Labs:     Lab Results   Component Value Date/Time    WBC 6.7 2022 12:57 PM    HGB 10.3 (L) 2022 12:57 PM    HCT 32.3 (L) 2022 12:57 PM    PLATELET 092  12:57 PM       No results found for this or any previous visit (from the past 24 hour(s)).

## 2022-05-19 NOTE — LACTATION NOTE
This note was copied from a baby's chart. Baby's blood sugar stabilized overnight by offering both breasts more frequently. Mother also provided pumped milk after nursing sessions. Baby nursing well and has improved throughout post partum stay, deep latch maintained, mother is comfortable, milk is in transition, baby feeding vigorously with rhythmic suck, swallow, breathe pattern, with audible swallowing, and evident milk transfer, both breasts offerd, baby is asleep following feeding. Baby is feeding on demand, voiding and stools present as appropriate over the last 24 hours. Mother states that she has no further questions for Lactation Consultant before discharge.

## 2022-05-19 NOTE — DISCHARGE INSTRUCTIONS
Patient Education        Vaginal Childbirth: Care Instructions  Overview     Vaginal birth means delivering a baby through the birth canal (vagina). During labor, the uterus tightens (contracts) regularly to thin and open the cervix and to push the baby out through the birth canal.  Your body will slowly heal in the next few weeks. It's easy to get too tired and overwhelmed during the first weeks after your baby is born. Changes in your hormones can shift your mood without warning. You may find it hard to meet the extra demands on your energy and time. Take it easy on yourself. Follow-up care is a key part of your treatment and safety. Be sure to make and go to all appointments, and call your doctor if you are having problems. It's also a good idea to know your test results and keep a list of the medicines you take. How can you care for yourself at home? Vaginal bleeding and cramps  · After delivery, you will have a bloody discharge from your vagina. This will turn pink within a week and then white or yellow after about 10 days. It may last for 2 to 4 weeks or longer, until the uterus has healed. Use sanitary pads until you stop bleeding. Using pads makes it easier to monitor your bleeding. · Don't worry if you pass some blood clots, as long as they are smaller than a golf ball. If you have a tear or stitches in your vaginal area, change the pad at least every 4 hours. This will help prevent soreness and infection. · You may have cramps for the first few days after childbirth. These are normal and occur as the uterus shrinks to normal size. Take an over-the-counter pain medicine, such as acetaminophen (Tylenol), ibuprofen (Advil, Motrin), or naproxen (Aleve), for cramps. Read and follow all instructions on the label. Do not take aspirin, because it can cause more bleeding. · Do not take two or more pain medicines at the same time unless the doctor told you to.  Many pain medicines have acetaminophen, which is Tylenol. Too much acetaminophen (Tylenol) can be harmful. Stitches  · If you have stitches, they will dissolve on their own and don't need to be removed. Follow your doctor's instructions for cleaning the stitched area. · Put ice or a cold pack on your painful area for 10 to 20 minutes at a time, several times a day, for the first few days. Put a thin cloth between the ice and your skin. · Sit in a few inches of warm water (sitz bath) 3 times a day and after bowel movements. The warm water helps with pain and itching. If you don't have a tub, a warm shower might help. Breast fullness  · Your breasts may overfill (engorge) in the first few days after delivery. To help milk flow and to relieve pain, warm your breasts in the shower or by using warm, moist towels before nursing. · If you aren't nursing, don't put warmth on your breasts or touch your breasts. Wear a bra that fits well and use ice until the fullness goes away. This usually takes 2 to 3 days. · Put ice or a cold pack on your breast after nursing to reduce swelling and pain. Put a thin cloth between the ice and your skin. Activity  · Eat a balanced diet. Don't try to lose weight by cutting calories. Keep taking your prenatal vitamins, or take a multivitamin. · Get as much rest as you can. Try to take naps when your baby sleeps during the day. · Get some exercise every day. But don't do any heavy exercise until your doctor says it is okay. · Wait until you are healed (about 4 to 6 weeks) before you have sexual intercourse. Your doctor will tell you when it is okay to have sex. · If you don't want to get pregnant, talk to your doctor about birth control. You can get pregnant even before your period returns. Also, you can get pregnant while you are breastfeeding. Mental health  · It's normal to have some sadness, anxiety, sleeplessness, and mood swings after you go home.  If you feel upset or hopeless for more than a few days or are having trouble doing the things you need to do, talk to your doctor. Constipation and hemorrhoids  · Drink plenty of fluids. If you have kidney, heart, or liver disease and have to limit fluids, talk with your doctor before you increase the amount of fluids you drink. · Eat plenty of fiber each day. Have a bran muffin or bran cereal for breakfast. Try eating a piece of fruit for a mid-afternoon snack. · For painful, itchy hemorrhoids, put ice or a cold pack on the area several times a day for 10 minutes at a time. Follow this by putting a warm compress on the area for another 10 to 20 minutes or by sitting in a shallow, warm bath. When should you call for help? Share this information with your partner, family, or a friend. They can help you watch for warning signs. Call 911  anytime you think you may need emergency care. For example, call if:    · You have thoughts of harming yourself, your baby, or another person.     · You passed out (lost consciousness).     · You have chest pain, are short of breath, or cough up blood.     · You have a seizure. Call your doctor now or seek immediate medical care if:    · You have signs of hemorrhage (too much bleeding), such as:  ? Heavy vaginal bleeding. This means that you are soaking through one or more pads in an hour. Or you pass blood clots bigger than an egg. ? Feeling dizzy or lightheaded, or you feel like you may faint. ? Feeling so tired or weak that you cannot do your usual activities. ? A fast or irregular heartbeat. ? New or worse belly pain.     · You have signs of infection, such as:  ? A fever. ? Vaginal discharge that smells bad.  ? New or worse belly pain.     · You have symptoms of a blood clot in your leg (called a deep vein thrombosis), such as:  ? Pain in the calf, back of the knee, thigh, or groin. ? Redness and swelling in your leg or groin.     · You have signs of preeclampsia, such as:  ? Sudden swelling of your face, hands, or feet.   ? New vision problems (such as dimness, blurring, or seeing spots). ? A severe headache. Watch closely for changes in your health, and be sure to contact your doctor if:    · Your vaginal bleeding isn't decreasing.     · You feel sad, anxious, or hopeless for more than a few days.     · You are having problems with your breasts or breastfeeding. Where can you learn more? Go to http://www.gray.com/  Enter Q237 in the search box to learn more about \"Vaginal Childbirth: Care Instructions. \"  Current as of: June 16, 2021               Content Version: 13.2  © 6919-5109 [employer]. Care instructions adapted under license by [employer] (which disclaims liability or warranty for this information). If you have questions about a medical condition or this instruction, always ask your healthcare professional. [address] any warranty or liability for your use of this information. Postpartum Support Groups  We know that all of us are dealing with a tremendous amount of uncertainty, confusion and disruption to our daily lives, which may result in increased anxiety, depression and fear. If you are feeling unsettled or worse, please know that we are here to help. During this time of increased caution and care for one another, [facility] [place] ([address] [place] [address]) is offering virtual support groups to ALL MOTHERS in [place] regardless of the age of your child/children as a way to help weather this emotional storm together. Social support is an important part of self-care during this time of physical distancing. Virtual postpartum support group meetings available at www. postpartumva.org  Warm Line: 230.866.6437    Breastfeeding Support Groups   1st and 3rd Wednesday of each month  2nd and 4th Tuesday of each month    [place] at www.[employer] under the \"About Us\" and \"Classes and Events tabs\"    [address]    Name: Juan Ramon Molina Alejandra Harris  YOB: 1995  Primary Diagnosis: Active Problems:    IUGR (intrauterine growth restriction) affecting care of mother (2022)      Normal labor and delivery (2022)        General:     Diet/Diet Restrictions:  · Eight 8-ounce glasses of fluid daily (water, juices); avoid excessive caffeine intake. · Meals/snacks as desired which are high in fiber and carbohydrates and low in fat and cholesterol. Physical Activity / Restrictions / Safety:     · Avoid heavy lifting, no more that 8 lbs. For 2-3 weeks;   · Limit use of stairs to 2 times daily for the first week home. · No driving for one week. · Avoid intercourse 4-6 weeks, no douching or tampon use. · Check with obstetrician before starting or resuming an exercise program.      Discharge Instructions/Special Treatment/Home Care Needs:     · Continue prenatal vitamins. · Continue to use squirt bottle with warm water on your episiotomy after each bathroom use until bleeding stops. · If steri-strips applied to your incision, remove in 7-10 days. Call your doctor for the following:     · Fever over 101 degrees by mouth. · Vaginal bleeding heavier than a normal menstrual period or clots larger than a golf ball. · Red streaks or increased swelling of legs, painful red streaks on your breast.  · Painful urination, constipation and increased pain or swelling or discharge with your incision. · If you feel extremely anxious or overwhelmed. · If you have thoughts of harming yourself and/or your baby. Pain Management:     · Take Acetaminophen (Tylenol) or Ibuprofen (Advil, Motrin), as directed for pain. · Use a warm Sitz bath 3 times daily to relieve episiotomy or hemorrhoidal discomfort. · For hemorrhoidal discomfort, use Tucks and Anusol cream as needed and directed. · Heating pad to  incision as needed.      Follow-Up Care:     Appointment with MD:   Follow-up Appointments   Procedures    FOLLOW UP VISIT Appointment in: 6 Weeks     Standing Status:   Standing     Number of Occurrences:   1     Order Specific Question:   Appointment in     Answer:   6 Weeks     Telephone number: [phone]    Signed By: Devorah Hanson MD                                                                                                   Date: 5/18/2022 Time: 9:36 AM

## 2022-05-19 NOTE — ROUTINE PROCESS
5516: Bedside and Verbal shift change report given to WEST Diallo RN (oncoming nurse) by MAO Choudhary RN (offgoing nurse). Report included the following information SBAR, Procedure Summary, Intake/Output, MAR and Recent Results. 1440: I have reviewed discharge instructions with the patient. The patient verbalized understanding.

## 2023-05-31 NOTE — ED PROVIDER NOTE - HEME LYMPH, MLM
No adenopathy or splenomegaly. No cervical or inguinal lymphadenopathy. Hydroxychloroquine Pregnancy And Lactation Text: This medication has been shown to cause fetal harm but it isn't assigned a Pregnancy Risk Category. There are small amounts excreted in breast milk.

## (undated) DEVICE — SPONGE GZ W4XL4IN COT 12 PLY TYP VII WVN C FLD DSGN

## (undated) DEVICE — ZIMMER® STERILE DISPOSABLE TOURNIQUET CUFF WITH PROTECTIVE SLEEVE AND PLC, DUAL PORT, SINGLE BLADDER, 34 IN. (86 CM)

## (undated) DEVICE — TOWEL SURG W17XL27IN STD BLU COT NONFENESTRATED PREWASHED

## (undated) DEVICE — STERILE POLYISOPRENE POWDER-FREE SURGICAL GLOVES: Brand: PROTEXIS

## (undated) DEVICE — SOLUTION IRRIG 3000ML LAC R FLX CONT

## (undated) DEVICE — 4.5 MM INCISOR PLUS STRAIGHT                                    BLADES, POWER/EP-1, VIOLET, PACKAGED                                    6 PER BOX, STERILE

## (undated) DEVICE — PAD,ABDOMINAL,5"X9",ST,LF,25/BX: Brand: MEDLINE INDUSTRIES, INC.

## (undated) DEVICE — KIT INSTR W/ 2.4MM GUIDEPIN SUT PASS WIRE NO2 FIBERWIRE

## (undated) DEVICE — SUT ETHLN 3-0 18IN PS2 BLK --

## (undated) DEVICE — DYONICS 25 INFLOW TUBE SET, 3 PER BOX

## (undated) DEVICE — PREP SKN CHLRAPRP APL 26ML STR --

## (undated) DEVICE — INFECTION CONTROL KIT SYS

## (undated) DEVICE — KNEE ARTHROSCOPY IV-LF: Brand: MEDLINE INDUSTRIES, INC.

## (undated) DEVICE — DRAPE,REIN 53X77,STERILE: Brand: MEDLINE

## (undated) DEVICE — SURGICAL PROCEDURE PACK BASIN MAJ SET CUST NO CAUT

## (undated) DEVICE — 4-PORT MANIFOLD: Brand: NEPTUNE 2

## (undated) DEVICE — GOWN,SIRUS,FABRNF,XL,20/CS: Brand: MEDLINE

## (undated) DEVICE — COVER LT HNDL PLAS RIG 1 PER PK

## (undated) DEVICE — REM POLYHESIVE ADULT PATIENT RETURN ELECTRODE: Brand: VALLEYLAB

## (undated) DEVICE — SMOKE EVACUATION TUBING WITH 8 IN INTEGRAL WAND AND SPONGE GUARD: Brand: BUFFALO FILTER

## (undated) DEVICE — PENCIL SMK EVAC L10FT DIA95MM TBNG NONSTICK W ADPT TO 22MM

## (undated) DEVICE — PADDING CST 4INX4YD --